# Patient Record
Sex: MALE | Race: WHITE | NOT HISPANIC OR LATINO | Employment: FULL TIME | URBAN - METROPOLITAN AREA
[De-identification: names, ages, dates, MRNs, and addresses within clinical notes are randomized per-mention and may not be internally consistent; named-entity substitution may affect disease eponyms.]

---

## 2017-01-12 ENCOUNTER — COMMUNICATION - HEALTHEAST (OUTPATIENT)
Dept: ADMINISTRATIVE | Facility: HOSPITAL | Age: 57
End: 2017-01-12

## 2017-01-26 ENCOUNTER — COMMUNICATION - HEALTHEAST (OUTPATIENT)
Dept: FAMILY MEDICINE | Facility: CLINIC | Age: 57
End: 2017-01-26

## 2017-02-09 ENCOUNTER — HOSPITAL ENCOUNTER (OUTPATIENT)
Dept: CT IMAGING | Facility: HOSPITAL | Age: 57
Setting detail: RADIATION/ONCOLOGY SERIES
Discharge: STILL ADMITTED OR ADMINISTRATIVE | End: 2017-02-09
Attending: INTERNAL MEDICINE

## 2017-02-09 DIAGNOSIS — C49.A0 GIST, MALIGNANT (H): ICD-10-CM

## 2017-02-10 ENCOUNTER — OFFICE VISIT - HEALTHEAST (OUTPATIENT)
Dept: FAMILY MEDICINE | Facility: CLINIC | Age: 57
End: 2017-02-10

## 2017-02-10 ENCOUNTER — COMMUNICATION - HEALTHEAST (OUTPATIENT)
Dept: FAMILY MEDICINE | Facility: CLINIC | Age: 57
End: 2017-02-10

## 2017-02-10 DIAGNOSIS — E66.9 OBESITY, UNSPECIFIED: ICD-10-CM

## 2017-02-10 DIAGNOSIS — L82.1 SEBORRHEIC KERATOSES: ICD-10-CM

## 2017-02-10 DIAGNOSIS — Z00.00 HEALTH CARE MAINTENANCE: ICD-10-CM

## 2017-02-10 DIAGNOSIS — E78.5 HYPERLIPIDEMIA: ICD-10-CM

## 2017-02-10 DIAGNOSIS — I10 HTN (HYPERTENSION): ICD-10-CM

## 2017-02-10 DIAGNOSIS — Z12.5 SCREENING PSA (PROSTATE SPECIFIC ANTIGEN): ICD-10-CM

## 2017-02-10 LAB
CHOLEST SERPL-MCNC: 149 MG/DL
FASTING STATUS PATIENT QL REPORTED: YES
HDLC SERPL-MCNC: 48 MG/DL
LDLC SERPL CALC-MCNC: 81 MG/DL
TRIGL SERPL-MCNC: 98 MG/DL

## 2017-02-13 ENCOUNTER — COMMUNICATION - HEALTHEAST (OUTPATIENT)
Dept: FAMILY MEDICINE | Facility: CLINIC | Age: 57
End: 2017-02-13

## 2017-02-13 ENCOUNTER — AMBULATORY - HEALTHEAST (OUTPATIENT)
Dept: ONCOLOGY | Facility: HOSPITAL | Age: 57
End: 2017-02-13

## 2017-02-13 ENCOUNTER — AMBULATORY - HEALTHEAST (OUTPATIENT)
Dept: INFUSION THERAPY | Facility: HOSPITAL | Age: 57
End: 2017-02-13

## 2017-02-13 ENCOUNTER — OFFICE VISIT - HEALTHEAST (OUTPATIENT)
Dept: ONCOLOGY | Facility: HOSPITAL | Age: 57
End: 2017-02-13

## 2017-02-13 DIAGNOSIS — C49.A3 MALIGNANT GASTROINTESTINAL STROMAL TUMOR (GIST) OF SMALL INTESTINE (H): ICD-10-CM

## 2017-02-13 DIAGNOSIS — C49.A4 GIST (GASTROINTESTINAL STROMA TUMOR), MALIGNANT, COLON (H): ICD-10-CM

## 2017-12-11 ENCOUNTER — OFFICE VISIT - HEALTHEAST (OUTPATIENT)
Dept: FAMILY MEDICINE | Facility: CLINIC | Age: 57
End: 2017-12-11

## 2017-12-11 DIAGNOSIS — Z00.00 ANNUAL PHYSICAL EXAM: ICD-10-CM

## 2017-12-11 DIAGNOSIS — E78.5 HYPERLIPIDEMIA: ICD-10-CM

## 2017-12-11 DIAGNOSIS — E66.9 OBESITY: ICD-10-CM

## 2017-12-11 DIAGNOSIS — I10 HTN (HYPERTENSION): ICD-10-CM

## 2017-12-11 LAB
CHOLEST SERPL-MCNC: 158 MG/DL
FASTING STATUS PATIENT QL REPORTED: YES
HDLC SERPL-MCNC: 49 MG/DL
LDLC SERPL CALC-MCNC: 93 MG/DL
PSA SERPL-MCNC: 0.7 NG/ML (ref 0–3.5)
TRIGL SERPL-MCNC: 81 MG/DL

## 2017-12-11 ASSESSMENT — MIFFLIN-ST. JEOR: SCORE: 2000.92

## 2017-12-12 ENCOUNTER — COMMUNICATION - HEALTHEAST (OUTPATIENT)
Dept: FAMILY MEDICINE | Facility: CLINIC | Age: 57
End: 2017-12-12

## 2018-07-13 ENCOUNTER — OFFICE VISIT - HEALTHEAST (OUTPATIENT)
Dept: FAMILY MEDICINE | Facility: CLINIC | Age: 58
End: 2018-07-13

## 2018-07-13 DIAGNOSIS — E78.5 HYPERLIPIDEMIA: ICD-10-CM

## 2018-07-13 DIAGNOSIS — I25.10 CAD (CORONARY ARTERY DISEASE): ICD-10-CM

## 2018-07-13 LAB
ALT SERPL W P-5'-P-CCNC: 35 U/L (ref 0–45)
CHOLEST SERPL-MCNC: 142 MG/DL
FASTING STATUS PATIENT QL REPORTED: YES
HDLC SERPL-MCNC: 51 MG/DL
LDLC SERPL CALC-MCNC: 68 MG/DL
TRIGL SERPL-MCNC: 117 MG/DL

## 2018-07-14 ENCOUNTER — COMMUNICATION - HEALTHEAST (OUTPATIENT)
Dept: FAMILY MEDICINE | Facility: CLINIC | Age: 58
End: 2018-07-14

## 2018-09-05 ENCOUNTER — OFFICE VISIT - HEALTHEAST (OUTPATIENT)
Dept: FAMILY MEDICINE | Facility: CLINIC | Age: 58
End: 2018-09-05

## 2018-09-05 DIAGNOSIS — M79.671 RIGHT FOOT PAIN: ICD-10-CM

## 2018-10-05 ENCOUNTER — OFFICE VISIT - HEALTHEAST (OUTPATIENT)
Dept: FAMILY MEDICINE | Facility: CLINIC | Age: 58
End: 2018-10-05

## 2018-10-05 DIAGNOSIS — E78.2 MIXED HYPERLIPIDEMIA: ICD-10-CM

## 2018-10-05 DIAGNOSIS — R10.9 LEFT SIDED ABDOMINAL PAIN: ICD-10-CM

## 2018-10-05 DIAGNOSIS — Z23 FLU VACCINE NEED: ICD-10-CM

## 2018-10-05 DIAGNOSIS — I10 ESSENTIAL HYPERTENSION: ICD-10-CM

## 2018-10-05 ASSESSMENT — MIFFLIN-ST. JEOR: SCORE: 2013.28

## 2018-12-14 ENCOUNTER — COMMUNICATION - HEALTHEAST (OUTPATIENT)
Dept: FAMILY MEDICINE | Facility: CLINIC | Age: 58
End: 2018-12-14

## 2018-12-14 ENCOUNTER — AMBULATORY - HEALTHEAST (OUTPATIENT)
Dept: FAMILY MEDICINE | Facility: CLINIC | Age: 58
End: 2018-12-14

## 2018-12-14 DIAGNOSIS — Z12.5 SCREENING PSA (PROSTATE SPECIFIC ANTIGEN): ICD-10-CM

## 2018-12-14 DIAGNOSIS — E78.5 HYPERLIPIDEMIA, UNSPECIFIED HYPERLIPIDEMIA TYPE: ICD-10-CM

## 2018-12-14 DIAGNOSIS — Z00.00 HEALTH CARE MAINTENANCE: ICD-10-CM

## 2018-12-17 ENCOUNTER — COMMUNICATION - HEALTHEAST (OUTPATIENT)
Dept: FAMILY MEDICINE | Facility: CLINIC | Age: 58
End: 2018-12-17

## 2018-12-17 ENCOUNTER — AMBULATORY - HEALTHEAST (OUTPATIENT)
Dept: LAB | Facility: CLINIC | Age: 58
End: 2018-12-17

## 2018-12-17 ENCOUNTER — OFFICE VISIT - HEALTHEAST (OUTPATIENT)
Dept: FAMILY MEDICINE | Facility: CLINIC | Age: 58
End: 2018-12-17

## 2018-12-17 DIAGNOSIS — I10 ESSENTIAL HYPERTENSION: ICD-10-CM

## 2018-12-17 DIAGNOSIS — E78.5 HYPERLIPIDEMIA, UNSPECIFIED HYPERLIPIDEMIA TYPE: ICD-10-CM

## 2018-12-17 DIAGNOSIS — Z00.00 HEALTH CARE MAINTENANCE: ICD-10-CM

## 2018-12-17 DIAGNOSIS — Z12.5 SCREENING PSA (PROSTATE SPECIFIC ANTIGEN): ICD-10-CM

## 2018-12-17 DIAGNOSIS — Z00.00 ROUTINE GENERAL MEDICAL EXAMINATION AT A HEALTH CARE FACILITY: ICD-10-CM

## 2018-12-17 DIAGNOSIS — M79.10 MYALGIA: ICD-10-CM

## 2018-12-17 DIAGNOSIS — C49.A3 MALIGNANT GASTROINTESTINAL STROMAL TUMOR (GIST) OF SMALL INTESTINE (H): ICD-10-CM

## 2018-12-17 DIAGNOSIS — Z11.59 ENCOUNTER FOR HEPATITIS C SCREENING TEST FOR LOW RISK PATIENT: ICD-10-CM

## 2018-12-17 LAB
ALBUMIN SERPL-MCNC: 3.9 G/DL (ref 3.5–5)
ALP SERPL-CCNC: 68 U/L (ref 45–120)
ALT SERPL W P-5'-P-CCNC: 26 U/L (ref 0–45)
ANION GAP SERPL CALCULATED.3IONS-SCNC: 7 MMOL/L (ref 5–18)
AST SERPL W P-5'-P-CCNC: 21 U/L (ref 0–40)
BILIRUB SERPL-MCNC: 0.7 MG/DL (ref 0–1)
BUN SERPL-MCNC: 17 MG/DL (ref 8–22)
CALCIUM SERPL-MCNC: 9.6 MG/DL (ref 8.5–10.5)
CHLORIDE BLD-SCNC: 106 MMOL/L (ref 98–107)
CHOLEST SERPL-MCNC: 138 MG/DL
CO2 SERPL-SCNC: 29 MMOL/L (ref 22–31)
CREAT SERPL-MCNC: 0.86 MG/DL (ref 0.7–1.3)
FASTING STATUS PATIENT QL REPORTED: YES
GFR SERPL CREATININE-BSD FRML MDRD: >60 ML/MIN/1.73M2
GLUCOSE BLD-MCNC: 99 MG/DL (ref 70–125)
HDLC SERPL-MCNC: 42 MG/DL
LDLC SERPL CALC-MCNC: 81 MG/DL
POTASSIUM BLD-SCNC: 4.3 MMOL/L (ref 3.5–5)
PROT SERPL-MCNC: 6.5 G/DL (ref 6–8)
PSA SERPL-MCNC: 0.5 NG/ML (ref 0–3.5)
SODIUM SERPL-SCNC: 142 MMOL/L (ref 136–145)
TRIGL SERPL-MCNC: 76 MG/DL

## 2018-12-17 ASSESSMENT — MIFFLIN-ST. JEOR: SCORE: 2007.84

## 2018-12-18 ENCOUNTER — COMMUNICATION - HEALTHEAST (OUTPATIENT)
Dept: FAMILY MEDICINE | Facility: CLINIC | Age: 58
End: 2018-12-18

## 2018-12-18 ENCOUNTER — AMBULATORY - HEALTHEAST (OUTPATIENT)
Dept: FAMILY MEDICINE | Facility: CLINIC | Age: 58
End: 2018-12-18

## 2018-12-18 DIAGNOSIS — C49.A3 MALIGNANT GASTROINTESTINAL STROMAL TUMOR (GIST) OF SMALL INTESTINE (H): ICD-10-CM

## 2018-12-18 DIAGNOSIS — Z11.59 ENCOUNTER FOR HEPATITIS C SCREENING TEST FOR LOW RISK PATIENT: ICD-10-CM

## 2018-12-18 DIAGNOSIS — E78.5 HYPERLIPIDEMIA, UNSPECIFIED HYPERLIPIDEMIA TYPE: ICD-10-CM

## 2018-12-18 DIAGNOSIS — M79.10 MYALGIA: ICD-10-CM

## 2018-12-18 LAB
25(OH)D3 SERPL-MCNC: 72 NG/ML (ref 30–80)
25(OH)D3 SERPL-MCNC: 72 NG/ML (ref 30–80)
CK SERPL-CCNC: 111 U/L (ref 30–190)

## 2018-12-19 LAB — HCV AB SERPL QL IA: NEGATIVE

## 2019-01-25 ENCOUNTER — COMMUNICATION - HEALTHEAST (OUTPATIENT)
Dept: FAMILY MEDICINE | Facility: CLINIC | Age: 59
End: 2019-01-25

## 2019-01-25 DIAGNOSIS — E78.5 HYPERLIPIDEMIA: ICD-10-CM

## 2019-02-18 ENCOUNTER — COMMUNICATION - HEALTHEAST (OUTPATIENT)
Dept: FAMILY MEDICINE | Facility: CLINIC | Age: 59
End: 2019-02-18

## 2019-02-18 DIAGNOSIS — I10 HTN (HYPERTENSION): ICD-10-CM

## 2019-03-25 ENCOUNTER — COMMUNICATION - HEALTHEAST (OUTPATIENT)
Dept: FAMILY MEDICINE | Facility: CLINIC | Age: 59
End: 2019-03-25

## 2019-04-01 ENCOUNTER — OFFICE VISIT - HEALTHEAST (OUTPATIENT)
Dept: FAMILY MEDICINE | Facility: CLINIC | Age: 59
End: 2019-04-01

## 2019-04-01 DIAGNOSIS — M25.561 ACUTE PAIN OF RIGHT KNEE: ICD-10-CM

## 2019-04-01 DIAGNOSIS — I10 ESSENTIAL HYPERTENSION: ICD-10-CM

## 2019-04-02 ENCOUNTER — RECORDS - HEALTHEAST (OUTPATIENT)
Dept: ADMINISTRATIVE | Facility: OTHER | Age: 59
End: 2019-04-02

## 2019-04-08 ENCOUNTER — COMMUNICATION - HEALTHEAST (OUTPATIENT)
Dept: FAMILY MEDICINE | Facility: CLINIC | Age: 59
End: 2019-04-08

## 2019-04-15 ENCOUNTER — COMMUNICATION - HEALTHEAST (OUTPATIENT)
Dept: FAMILY MEDICINE | Facility: CLINIC | Age: 59
End: 2019-04-15

## 2019-04-26 ENCOUNTER — COMMUNICATION - HEALTHEAST (OUTPATIENT)
Dept: FAMILY MEDICINE | Facility: CLINIC | Age: 59
End: 2019-04-26

## 2019-04-26 ENCOUNTER — AMBULATORY - HEALTHEAST (OUTPATIENT)
Dept: FAMILY MEDICINE | Facility: CLINIC | Age: 59
End: 2019-04-26

## 2019-04-26 DIAGNOSIS — G89.29 CHRONIC PAIN OF RIGHT KNEE: ICD-10-CM

## 2019-04-26 DIAGNOSIS — M25.561 CHRONIC PAIN OF RIGHT KNEE: ICD-10-CM

## 2019-05-01 ENCOUNTER — COMMUNICATION - HEALTHEAST (OUTPATIENT)
Dept: FAMILY MEDICINE | Facility: CLINIC | Age: 59
End: 2019-05-01

## 2019-05-02 ENCOUNTER — RECORDS - HEALTHEAST (OUTPATIENT)
Dept: ADMINISTRATIVE | Facility: OTHER | Age: 59
End: 2019-05-02

## 2019-05-06 ENCOUNTER — COMMUNICATION - HEALTHEAST (OUTPATIENT)
Dept: FAMILY MEDICINE | Facility: CLINIC | Age: 59
End: 2019-05-06

## 2019-05-09 ENCOUNTER — COMMUNICATION - HEALTHEAST (OUTPATIENT)
Dept: FAMILY MEDICINE | Facility: CLINIC | Age: 59
End: 2019-05-09

## 2019-05-10 ENCOUNTER — COMMUNICATION - HEALTHEAST (OUTPATIENT)
Dept: FAMILY MEDICINE | Facility: CLINIC | Age: 59
End: 2019-05-10

## 2019-05-10 DIAGNOSIS — S83.241A OTHER TEAR OF MEDIAL MENISCUS OF RIGHT KNEE AS CURRENT INJURY, INITIAL ENCOUNTER: ICD-10-CM

## 2019-05-17 ENCOUNTER — COMMUNICATION - HEALTHEAST (OUTPATIENT)
Dept: FAMILY MEDICINE | Facility: CLINIC | Age: 59
End: 2019-05-17

## 2019-05-17 ENCOUNTER — RECORDS - HEALTHEAST (OUTPATIENT)
Dept: ADMINISTRATIVE | Facility: OTHER | Age: 59
End: 2019-05-17

## 2019-05-30 ENCOUNTER — OFFICE VISIT - HEALTHEAST (OUTPATIENT)
Dept: FAMILY MEDICINE | Facility: CLINIC | Age: 59
End: 2019-05-30

## 2019-05-30 ENCOUNTER — COMMUNICATION - HEALTHEAST (OUTPATIENT)
Dept: FAMILY MEDICINE | Facility: CLINIC | Age: 59
End: 2019-05-30

## 2019-05-30 DIAGNOSIS — S83.241D TEAR OF MEDIAL MENISCUS OF RIGHT KNEE, CURRENT, UNSPECIFIED TEAR TYPE, SUBSEQUENT ENCOUNTER: ICD-10-CM

## 2019-05-30 DIAGNOSIS — Z01.818 PRE-OPERATIVE EXAMINATION: ICD-10-CM

## 2019-05-30 DIAGNOSIS — I10 ESSENTIAL HYPERTENSION: ICD-10-CM

## 2019-05-30 DIAGNOSIS — I25.10 CORONARY ARTERY DISEASE INVOLVING NATIVE HEART WITHOUT ANGINA PECTORIS, UNSPECIFIED VESSEL OR LESION TYPE: ICD-10-CM

## 2019-05-30 LAB
ANION GAP SERPL CALCULATED.3IONS-SCNC: 8 MMOL/L (ref 5–18)
ATRIAL RATE - MUSE: 55 BPM
BASOPHILS # BLD AUTO: 0 THOU/UL (ref 0–0.2)
BASOPHILS NFR BLD AUTO: 1 % (ref 0–2)
BUN SERPL-MCNC: 18 MG/DL (ref 8–22)
CALCIUM SERPL-MCNC: 10.1 MG/DL (ref 8.5–10.5)
CHLORIDE BLD-SCNC: 103 MMOL/L (ref 98–107)
CO2 SERPL-SCNC: 28 MMOL/L (ref 22–31)
CREAT SERPL-MCNC: 0.94 MG/DL (ref 0.7–1.3)
DIASTOLIC BLOOD PRESSURE - MUSE: NORMAL MMHG
EOSINOPHIL # BLD AUTO: 0.2 THOU/UL (ref 0–0.4)
EOSINOPHIL NFR BLD AUTO: 4 % (ref 0–6)
ERYTHROCYTE [DISTWIDTH] IN BLOOD BY AUTOMATED COUNT: 11.8 % (ref 11–14.5)
GFR SERPL CREATININE-BSD FRML MDRD: >60 ML/MIN/1.73M2
GLUCOSE BLD-MCNC: 86 MG/DL (ref 70–125)
HCT VFR BLD AUTO: 44.8 % (ref 40–54)
HGB BLD-MCNC: 15.5 G/DL (ref 14–18)
INTERPRETATION ECG - MUSE: NORMAL
LYMPHOCYTES # BLD AUTO: 1.7 THOU/UL (ref 0.8–4.4)
LYMPHOCYTES NFR BLD AUTO: 34 % (ref 20–40)
MCH RBC QN AUTO: 32.1 PG (ref 27–34)
MCHC RBC AUTO-ENTMCNC: 34.6 G/DL (ref 32–36)
MCV RBC AUTO: 93 FL (ref 80–100)
MONOCYTES # BLD AUTO: 0.6 THOU/UL (ref 0–0.9)
MONOCYTES NFR BLD AUTO: 11 % (ref 2–10)
NEUTROPHILS # BLD AUTO: 2.5 THOU/UL (ref 2–7.7)
NEUTROPHILS NFR BLD AUTO: 51 % (ref 50–70)
P AXIS - MUSE: 31 DEGREES
PLATELET # BLD AUTO: 179 THOU/UL (ref 140–440)
PMV BLD AUTO: 8.2 FL (ref 7–10)
POTASSIUM BLD-SCNC: 4.6 MMOL/L (ref 3.5–5)
PR INTERVAL - MUSE: 204 MS
QRS DURATION - MUSE: 94 MS
QT - MUSE: 448 MS
QTC - MUSE: 428 MS
R AXIS - MUSE: 12 DEGREES
RBC # BLD AUTO: 4.83 MILL/UL (ref 4.4–6.2)
SODIUM SERPL-SCNC: 139 MMOL/L (ref 136–145)
SYSTOLIC BLOOD PRESSURE - MUSE: NORMAL MMHG
T AXIS - MUSE: 20 DEGREES
VENTRICULAR RATE- MUSE: 55 BPM
WBC: 5 THOU/UL (ref 4–11)

## 2019-05-30 ASSESSMENT — MIFFLIN-ST. JEOR: SCORE: 2045.94

## 2019-06-03 ENCOUNTER — COMMUNICATION - HEALTHEAST (OUTPATIENT)
Dept: FAMILY MEDICINE | Facility: CLINIC | Age: 59
End: 2019-06-03

## 2019-06-03 ENCOUNTER — HOSPITAL ENCOUNTER (OUTPATIENT)
Dept: CARDIOLOGY | Facility: HOSPITAL | Age: 59
Discharge: HOME | End: 2019-06-03
Attending: FAMILY MEDICINE

## 2019-06-03 ENCOUNTER — HOSPITAL ENCOUNTER (OUTPATIENT)
Dept: NUCLEAR MEDICINE | Facility: HOSPITAL | Age: 59
Discharge: HOME | End: 2019-06-03
Attending: FAMILY MEDICINE

## 2019-06-03 DIAGNOSIS — I25.10 CORONARY ARTERY DISEASE INVOLVING NATIVE HEART WITHOUT ANGINA PECTORIS, UNSPECIFIED VESSEL OR LESION TYPE: ICD-10-CM

## 2019-06-03 DIAGNOSIS — S83.241D TEAR OF MEDIAL MENISCUS OF RIGHT KNEE, CURRENT, UNSPECIFIED TEAR TYPE, SUBSEQUENT ENCOUNTER: ICD-10-CM

## 2019-06-03 DIAGNOSIS — Z01.818 PRE-OPERATIVE EXAMINATION: ICD-10-CM

## 2019-06-03 LAB
CV STRESS CURRENT BP HE: NORMAL
CV STRESS CURRENT HR HE: 101
CV STRESS CURRENT HR HE: 103
CV STRESS CURRENT HR HE: 104
CV STRESS CURRENT HR HE: 106
CV STRESS CURRENT HR HE: 111
CV STRESS CURRENT HR HE: 119
CV STRESS CURRENT HR HE: 120
CV STRESS CURRENT HR HE: 120
CV STRESS CURRENT HR HE: 121
CV STRESS CURRENT HR HE: 129
CV STRESS CURRENT HR HE: 134
CV STRESS CURRENT HR HE: 135
CV STRESS CURRENT HR HE: 137
CV STRESS CURRENT HR HE: 141
CV STRESS CURRENT HR HE: 143
CV STRESS CURRENT HR HE: 146
CV STRESS CURRENT HR HE: 146
CV STRESS CURRENT HR HE: 65
CV STRESS CURRENT HR HE: 73
CV STRESS CURRENT HR HE: 89
CV STRESS CURRENT HR HE: 90
CV STRESS CURRENT HR HE: 90
CV STRESS CURRENT HR HE: 91
CV STRESS CURRENT HR HE: 91
CV STRESS CURRENT HR HE: 95
CV STRESS CURRENT HR HE: 96
CV STRESS CURRENT HR HE: 99
CV STRESS DEVIATION TIME HE: NORMAL
CV STRESS ECHO PERCENT HR HE: NORMAL
CV STRESS EXERCISE STAGE HE: NORMAL
CV STRESS EXERCISE STAGE REACHED HE: NORMAL
CV STRESS FINAL RESTING BP HE: NORMAL
CV STRESS FINAL RESTING HR HE: 90
CV STRESS MAX HR HE: 146
CV STRESS MAX TREADMILL GRADE HE: 14
CV STRESS MAX TREADMILL SPEED HE: 3.4
CV STRESS PEAK DIA BP HE: NORMAL
CV STRESS PEAK SYS BP HE: NORMAL
CV STRESS PHASE HE: NORMAL
CV STRESS PROTOCOL HE: NORMAL
CV STRESS RESTING PT POSITION HE: NORMAL
CV STRESS RESTING PT POSITION HE: NORMAL
CV STRESS ST DEVIATION AMOUNT HE: NORMAL
CV STRESS ST DEVIATION ELEVATION HE: NORMAL
CV STRESS ST EVELATION AMOUNT HE: NORMAL
CV STRESS TEST TYPE HE: NORMAL
CV STRESS TOTAL STAGE TIME MIN 1 HE: NORMAL
NUC STRESS EJECTION FRACTION: 70 %
STRESS ECHO BASELINE BP: NORMAL
STRESS ECHO BASELINE HR: 65
STRESS ECHO CALCULATED PERCENT HR: 91 %
STRESS ECHO LAST STRESS BP: NORMAL
STRESS ECHO LAST STRESS HR: 146
STRESS ECHO POST ESTIMATED WORKLOAD: 10.3
STRESS ECHO POST EXERCISE DUR MIN: 8
STRESS ECHO POST EXERCISE DUR SEC: 58
STRESS ECHO TARGET HR: 137

## 2019-06-21 ENCOUNTER — RECORDS - HEALTHEAST (OUTPATIENT)
Dept: ADMINISTRATIVE | Facility: OTHER | Age: 59
End: 2019-06-21

## 2019-07-02 ENCOUNTER — RECORDS - HEALTHEAST (OUTPATIENT)
Dept: ADMINISTRATIVE | Facility: OTHER | Age: 59
End: 2019-07-02

## 2019-10-27 ENCOUNTER — COMMUNICATION - HEALTHEAST (OUTPATIENT)
Dept: FAMILY MEDICINE | Facility: CLINIC | Age: 59
End: 2019-10-27

## 2019-10-27 DIAGNOSIS — I10 HTN (HYPERTENSION): ICD-10-CM

## 2019-11-20 ENCOUNTER — RECORDS - HEALTHEAST (OUTPATIENT)
Dept: GENERAL RADIOLOGY | Facility: CLINIC | Age: 59
End: 2019-11-20

## 2019-11-20 ENCOUNTER — OFFICE VISIT - HEALTHEAST (OUTPATIENT)
Dept: FAMILY MEDICINE | Facility: CLINIC | Age: 59
End: 2019-11-20

## 2019-11-20 DIAGNOSIS — M25.552 HIP PAIN, LEFT: ICD-10-CM

## 2019-11-20 DIAGNOSIS — I10 ESSENTIAL HYPERTENSION: ICD-10-CM

## 2019-11-20 DIAGNOSIS — M16.12 PRIMARY OSTEOARTHRITIS OF LEFT HIP: ICD-10-CM

## 2019-11-20 DIAGNOSIS — M25.552 PAIN IN LEFT HIP: ICD-10-CM

## 2019-11-20 ASSESSMENT — MIFFLIN-ST. JEOR: SCORE: 2023.26

## 2019-12-18 ENCOUNTER — OFFICE VISIT - HEALTHEAST (OUTPATIENT)
Dept: FAMILY MEDICINE | Facility: CLINIC | Age: 59
End: 2019-12-18

## 2019-12-18 ENCOUNTER — COMMUNICATION - HEALTHEAST (OUTPATIENT)
Dept: FAMILY MEDICINE | Facility: CLINIC | Age: 59
End: 2019-12-18

## 2019-12-18 DIAGNOSIS — I25.10 CORONARY ARTERY DISEASE INVOLVING NATIVE HEART WITHOUT ANGINA PECTORIS, UNSPECIFIED VESSEL OR LESION TYPE: ICD-10-CM

## 2019-12-18 DIAGNOSIS — D12.6 ADENOMATOUS POLYP OF COLON, UNSPECIFIED PART OF COLON: ICD-10-CM

## 2019-12-18 DIAGNOSIS — E78.5 HYPERLIPIDEMIA, UNSPECIFIED HYPERLIPIDEMIA TYPE: ICD-10-CM

## 2019-12-18 DIAGNOSIS — Z00.00 HEALTH CARE MAINTENANCE: ICD-10-CM

## 2019-12-18 DIAGNOSIS — Z23 NEED FOR SHINGLES VACCINE: ICD-10-CM

## 2019-12-18 DIAGNOSIS — R97.20 RISING PSA LEVEL: ICD-10-CM

## 2019-12-18 DIAGNOSIS — I10 ESSENTIAL HYPERTENSION: ICD-10-CM

## 2019-12-18 DIAGNOSIS — E66.09 OBESITY DUE TO EXCESS CALORIES WITHOUT SERIOUS COMORBIDITY, UNSPECIFIED CLASSIFICATION: ICD-10-CM

## 2019-12-18 DIAGNOSIS — Z12.5 SCREENING PSA (PROSTATE SPECIFIC ANTIGEN): ICD-10-CM

## 2019-12-18 LAB
ALBUMIN SERPL-MCNC: 4.3 G/DL (ref 3.5–5)
ALP SERPL-CCNC: 67 U/L (ref 45–120)
ALT SERPL W P-5'-P-CCNC: 28 U/L (ref 0–45)
ANION GAP SERPL CALCULATED.3IONS-SCNC: 11 MMOL/L (ref 5–18)
AST SERPL W P-5'-P-CCNC: 22 U/L (ref 0–40)
BASOPHILS # BLD AUTO: 0 THOU/UL (ref 0–0.2)
BASOPHILS NFR BLD AUTO: 1 % (ref 0–2)
BILIRUB SERPL-MCNC: 1.2 MG/DL (ref 0–1)
BUN SERPL-MCNC: 15 MG/DL (ref 8–22)
CALCIUM SERPL-MCNC: 10 MG/DL (ref 8.5–10.5)
CHLORIDE BLD-SCNC: 100 MMOL/L (ref 98–107)
CHOLEST SERPL-MCNC: 153 MG/DL
CK SERPL-CCNC: 73 U/L (ref 30–190)
CO2 SERPL-SCNC: 28 MMOL/L (ref 22–31)
CREAT SERPL-MCNC: 0.91 MG/DL (ref 0.7–1.3)
EOSINOPHIL # BLD AUTO: 0.2 THOU/UL (ref 0–0.4)
EOSINOPHIL NFR BLD AUTO: 5 % (ref 0–6)
ERYTHROCYTE [DISTWIDTH] IN BLOOD BY AUTOMATED COUNT: 12.1 % (ref 11–14.5)
FASTING STATUS PATIENT QL REPORTED: YES
GFR SERPL CREATININE-BSD FRML MDRD: >60 ML/MIN/1.73M2
GLUCOSE BLD-MCNC: 93 MG/DL (ref 70–125)
HCT VFR BLD AUTO: 47.2 % (ref 40–54)
HDLC SERPL-MCNC: 50 MG/DL
HGB BLD-MCNC: 16.2 G/DL (ref 14–18)
LDLC SERPL CALC-MCNC: 84 MG/DL
LYMPHOCYTES # BLD AUTO: 1.4 THOU/UL (ref 0.8–4.4)
LYMPHOCYTES NFR BLD AUTO: 27 % (ref 20–40)
MCH RBC QN AUTO: 32.1 PG (ref 27–34)
MCHC RBC AUTO-ENTMCNC: 34.3 G/DL (ref 32–36)
MCV RBC AUTO: 94 FL (ref 80–100)
MONOCYTES # BLD AUTO: 0.5 THOU/UL (ref 0–0.9)
MONOCYTES NFR BLD AUTO: 10 % (ref 2–10)
NEUTROPHILS # BLD AUTO: 3.1 THOU/UL (ref 2–7.7)
NEUTROPHILS NFR BLD AUTO: 58 % (ref 50–70)
PLATELET # BLD AUTO: 171 THOU/UL (ref 140–440)
PMV BLD AUTO: 8.5 FL (ref 7–10)
POTASSIUM BLD-SCNC: 4.4 MMOL/L (ref 3.5–5)
PROT SERPL-MCNC: 7 G/DL (ref 6–8)
PSA SERPL-MCNC: 1.5 NG/ML (ref 0–3.5)
RBC # BLD AUTO: 5.04 MILL/UL (ref 4.4–6.2)
SODIUM SERPL-SCNC: 139 MMOL/L (ref 136–145)
TRIGL SERPL-MCNC: 97 MG/DL
WBC: 5.3 THOU/UL (ref 4–11)

## 2019-12-18 ASSESSMENT — MIFFLIN-ST. JEOR: SCORE: 2009.31

## 2020-01-09 ENCOUNTER — RECORDS - HEALTHEAST (OUTPATIENT)
Dept: ADMINISTRATIVE | Facility: OTHER | Age: 60
End: 2020-01-09

## 2020-01-17 ENCOUNTER — COMMUNICATION - HEALTHEAST (OUTPATIENT)
Dept: FAMILY MEDICINE | Facility: CLINIC | Age: 60
End: 2020-01-17

## 2020-01-17 DIAGNOSIS — E78.5 HYPERLIPIDEMIA: ICD-10-CM

## 2020-02-11 ENCOUNTER — COMMUNICATION - HEALTHEAST (OUTPATIENT)
Dept: FAMILY MEDICINE | Facility: CLINIC | Age: 60
End: 2020-02-11

## 2020-02-19 ENCOUNTER — AMBULATORY - HEALTHEAST (OUTPATIENT)
Dept: NURSING | Facility: CLINIC | Age: 60
End: 2020-02-19

## 2020-04-21 ENCOUNTER — COMMUNICATION - HEALTHEAST (OUTPATIENT)
Dept: FAMILY MEDICINE | Facility: CLINIC | Age: 60
End: 2020-04-21

## 2020-04-21 DIAGNOSIS — I10 HTN (HYPERTENSION): ICD-10-CM

## 2020-06-25 ENCOUNTER — OFFICE VISIT - HEALTHEAST (OUTPATIENT)
Dept: FAMILY MEDICINE | Facility: CLINIC | Age: 60
End: 2020-06-25

## 2020-06-25 DIAGNOSIS — I10 HTN (HYPERTENSION): ICD-10-CM

## 2020-06-25 DIAGNOSIS — I10 ESSENTIAL HYPERTENSION: ICD-10-CM

## 2020-06-25 RX ORDER — UBIDECARENONE 100 MG
300 CAPSULE ORAL DAILY
Status: SHIPPED | COMMUNITY
Start: 2020-06-25

## 2020-06-29 ENCOUNTER — COMMUNICATION - HEALTHEAST (OUTPATIENT)
Dept: FAMILY MEDICINE | Facility: CLINIC | Age: 60
End: 2020-06-29

## 2020-07-16 ENCOUNTER — COMMUNICATION - HEALTHEAST (OUTPATIENT)
Dept: FAMILY MEDICINE | Facility: CLINIC | Age: 60
End: 2020-07-16

## 2020-07-16 ENCOUNTER — AMBULATORY - HEALTHEAST (OUTPATIENT)
Dept: LAB | Facility: CLINIC | Age: 60
End: 2020-07-16

## 2020-07-16 DIAGNOSIS — I10 HTN (HYPERTENSION): ICD-10-CM

## 2020-07-16 LAB
ANION GAP SERPL CALCULATED.3IONS-SCNC: 9 MMOL/L (ref 5–18)
BUN SERPL-MCNC: 18 MG/DL (ref 8–22)
CALCIUM SERPL-MCNC: 9.8 MG/DL (ref 8.5–10.5)
CHLORIDE BLD-SCNC: 102 MMOL/L (ref 98–107)
CO2 SERPL-SCNC: 28 MMOL/L (ref 22–31)
CREAT SERPL-MCNC: 0.87 MG/DL (ref 0.7–1.3)
GFR SERPL CREATININE-BSD FRML MDRD: >60 ML/MIN/1.73M2
GLUCOSE BLD-MCNC: 89 MG/DL (ref 70–125)
POTASSIUM BLD-SCNC: 3.9 MMOL/L (ref 3.5–5)
SODIUM SERPL-SCNC: 139 MMOL/L (ref 136–145)

## 2020-07-17 ENCOUNTER — COMMUNICATION - HEALTHEAST (OUTPATIENT)
Dept: FAMILY MEDICINE | Facility: CLINIC | Age: 60
End: 2020-07-17

## 2020-08-31 ENCOUNTER — COMMUNICATION - HEALTHEAST (OUTPATIENT)
Dept: FAMILY MEDICINE | Facility: CLINIC | Age: 60
End: 2020-08-31

## 2020-08-31 DIAGNOSIS — I10 HTN (HYPERTENSION): ICD-10-CM

## 2020-09-02 ENCOUNTER — AMBULATORY - HEALTHEAST (OUTPATIENT)
Dept: FAMILY MEDICINE | Facility: CLINIC | Age: 60
End: 2020-09-02

## 2020-09-02 ENCOUNTER — COMMUNICATION - HEALTHEAST (OUTPATIENT)
Dept: FAMILY MEDICINE | Facility: CLINIC | Age: 60
End: 2020-09-02

## 2020-09-02 DIAGNOSIS — M25.562 CHRONIC PAIN OF LEFT KNEE: ICD-10-CM

## 2020-09-02 DIAGNOSIS — G89.29 CHRONIC PAIN OF LEFT KNEE: ICD-10-CM

## 2020-09-14 ENCOUNTER — COMMUNICATION - HEALTHEAST (OUTPATIENT)
Dept: FAMILY MEDICINE | Facility: CLINIC | Age: 60
End: 2020-09-14

## 2020-10-09 ENCOUNTER — OFFICE VISIT - HEALTHEAST (OUTPATIENT)
Dept: FAMILY MEDICINE | Facility: CLINIC | Age: 60
End: 2020-10-09

## 2020-10-09 DIAGNOSIS — I10 ESSENTIAL HYPERTENSION: ICD-10-CM

## 2020-10-09 DIAGNOSIS — E78.5 HYPERLIPIDEMIA, UNSPECIFIED HYPERLIPIDEMIA TYPE: ICD-10-CM

## 2020-10-09 DIAGNOSIS — Z01.818 PREOP GENERAL PHYSICAL EXAM: ICD-10-CM

## 2020-10-09 DIAGNOSIS — S83.282A TEAR OF LATERAL CARTILAGE OR MENISCUS OF KNEE, CURRENT, LEFT, INITIAL ENCOUNTER: ICD-10-CM

## 2020-10-09 DIAGNOSIS — I25.10 CORONARY ARTERY DISEASE INVOLVING NATIVE HEART WITHOUT ANGINA PECTORIS, UNSPECIFIED VESSEL OR LESION TYPE: ICD-10-CM

## 2020-10-09 LAB
ANION GAP SERPL CALCULATED.3IONS-SCNC: 11 MMOL/L (ref 5–18)
ATRIAL RATE - MUSE: 74 BPM
BUN SERPL-MCNC: 25 MG/DL (ref 8–22)
CALCIUM SERPL-MCNC: 9.9 MG/DL (ref 8.5–10.5)
CHLORIDE BLD-SCNC: 101 MMOL/L (ref 98–107)
CO2 SERPL-SCNC: 27 MMOL/L (ref 22–31)
CREAT SERPL-MCNC: 0.96 MG/DL (ref 0.7–1.3)
DIASTOLIC BLOOD PRESSURE - MUSE: NORMAL
ERYTHROCYTE [DISTWIDTH] IN BLOOD BY AUTOMATED COUNT: 11.5 % (ref 11–14.5)
GFR SERPL CREATININE-BSD FRML MDRD: >60 ML/MIN/1.73M2
GLUCOSE BLD-MCNC: 108 MG/DL (ref 70–125)
HCT VFR BLD AUTO: 47.7 % (ref 40–54)
HGB BLD-MCNC: 16.3 G/DL (ref 14–18)
INTERPRETATION ECG - MUSE: NORMAL
MCH RBC QN AUTO: 32.3 PG (ref 27–34)
MCHC RBC AUTO-ENTMCNC: 34.2 G/DL (ref 32–36)
MCV RBC AUTO: 94 FL (ref 80–100)
P AXIS - MUSE: 41 DEGREES
PLATELET # BLD AUTO: 191 THOU/UL (ref 140–440)
PMV BLD AUTO: 8.2 FL (ref 7–10)
POTASSIUM BLD-SCNC: 3.5 MMOL/L (ref 3.5–5)
PR INTERVAL - MUSE: 184 MS
QRS DURATION - MUSE: 96 MS
QT - MUSE: 382 MS
QTC - MUSE: 424 MS
R AXIS - MUSE: 16 DEGREES
RBC # BLD AUTO: 5.05 MILL/UL (ref 4.4–6.2)
SODIUM SERPL-SCNC: 139 MMOL/L (ref 136–145)
SYSTOLIC BLOOD PRESSURE - MUSE: NORMAL
T AXIS - MUSE: 43 DEGREES
VENTRICULAR RATE- MUSE: 74 BPM
WBC: 5.6 THOU/UL (ref 4–11)

## 2020-10-09 ASSESSMENT — MIFFLIN-ST. JEOR: SCORE: 1959.76

## 2020-10-15 ENCOUNTER — COMMUNICATION - HEALTHEAST (OUTPATIENT)
Dept: FAMILY MEDICINE | Facility: CLINIC | Age: 60
End: 2020-10-15

## 2020-10-15 DIAGNOSIS — Z01.818 PREOP GENERAL PHYSICAL EXAM: ICD-10-CM

## 2020-10-15 DIAGNOSIS — S83.282A TEAR OF LATERAL CARTILAGE OR MENISCUS OF KNEE, CURRENT, LEFT, INITIAL ENCOUNTER: ICD-10-CM

## 2020-10-16 ENCOUNTER — COMMUNICATION - HEALTHEAST (OUTPATIENT)
Dept: FAMILY MEDICINE | Facility: CLINIC | Age: 60
End: 2020-10-16

## 2020-10-19 ENCOUNTER — COMMUNICATION - HEALTHEAST (OUTPATIENT)
Dept: FAMILY MEDICINE | Facility: CLINIC | Age: 60
End: 2020-10-19

## 2020-10-19 DIAGNOSIS — E78.5 HYPERLIPIDEMIA: ICD-10-CM

## 2020-10-21 RX ORDER — ROSUVASTATIN CALCIUM 20 MG/1
TABLET, COATED ORAL
Qty: 90 TABLET | Refills: 3 | Status: SHIPPED | OUTPATIENT
Start: 2020-10-21 | End: 2021-10-11

## 2020-10-23 ENCOUNTER — AMBULATORY - HEALTHEAST (OUTPATIENT)
Dept: LAB | Facility: CLINIC | Age: 60
End: 2020-10-23

## 2020-10-23 DIAGNOSIS — S83.282A TEAR OF LATERAL CARTILAGE OR MENISCUS OF KNEE, CURRENT, LEFT, INITIAL ENCOUNTER: ICD-10-CM

## 2020-10-23 DIAGNOSIS — Z01.818 PREOP GENERAL PHYSICAL EXAM: ICD-10-CM

## 2020-10-25 ENCOUNTER — COMMUNICATION - HEALTHEAST (OUTPATIENT)
Dept: SCHEDULING | Facility: CLINIC | Age: 60
End: 2020-10-25

## 2020-10-26 ENCOUNTER — RECORDS - HEALTHEAST (OUTPATIENT)
Dept: ADMINISTRATIVE | Facility: OTHER | Age: 60
End: 2020-10-26

## 2020-11-10 ENCOUNTER — RECORDS - HEALTHEAST (OUTPATIENT)
Dept: ADMINISTRATIVE | Facility: OTHER | Age: 60
End: 2020-11-10

## 2020-11-10 ENCOUNTER — COMMUNICATION - HEALTHEAST (OUTPATIENT)
Dept: FAMILY MEDICINE | Facility: CLINIC | Age: 60
End: 2020-11-10

## 2020-11-10 ENCOUNTER — AMBULATORY - HEALTHEAST (OUTPATIENT)
Dept: FAMILY MEDICINE | Facility: CLINIC | Age: 60
End: 2020-11-10

## 2020-11-10 DIAGNOSIS — Z20.822 EXPOSURE TO 2019 NOVEL CORONAVIRUS: ICD-10-CM

## 2020-11-12 ENCOUNTER — OFFICE VISIT - HEALTHEAST (OUTPATIENT)
Dept: LAB | Facility: CLINIC | Age: 60
End: 2020-11-12

## 2020-11-12 DIAGNOSIS — Z20.822 EXPOSURE TO 2019 NOVEL CORONAVIRUS: ICD-10-CM

## 2020-11-15 ENCOUNTER — COMMUNICATION - HEALTHEAST (OUTPATIENT)
Dept: SCHEDULING | Facility: CLINIC | Age: 60
End: 2020-11-15

## 2020-11-15 ENCOUNTER — COMMUNICATION - HEALTHEAST (OUTPATIENT)
Dept: FAMILY MEDICINE | Facility: CLINIC | Age: 60
End: 2020-11-15

## 2020-11-17 ENCOUNTER — COMMUNICATION - HEALTHEAST (OUTPATIENT)
Dept: FAMILY MEDICINE | Facility: CLINIC | Age: 60
End: 2020-11-17

## 2020-11-17 ENCOUNTER — OFFICE VISIT - HEALTHEAST (OUTPATIENT)
Dept: FAMILY MEDICINE | Facility: CLINIC | Age: 60
End: 2020-11-17

## 2020-11-17 DIAGNOSIS — I25.10 CORONARY ARTERY DISEASE INVOLVING NATIVE HEART WITHOUT ANGINA PECTORIS, UNSPECIFIED VESSEL OR LESION TYPE: ICD-10-CM

## 2020-11-17 DIAGNOSIS — Z00.00 HEALTH CARE MAINTENANCE: ICD-10-CM

## 2020-11-17 DIAGNOSIS — D12.6 ADENOMATOUS POLYP OF COLON, UNSPECIFIED PART OF COLON: ICD-10-CM

## 2020-11-17 DIAGNOSIS — E66.09 CLASS 2 OBESITY DUE TO EXCESS CALORIES WITHOUT SERIOUS COMORBIDITY IN ADULT, UNSPECIFIED BMI: ICD-10-CM

## 2020-11-17 DIAGNOSIS — E66.812 CLASS 2 OBESITY DUE TO EXCESS CALORIES WITHOUT SERIOUS COMORBIDITY IN ADULT, UNSPECIFIED BMI: ICD-10-CM

## 2020-11-17 DIAGNOSIS — Z23 NEED FOR TETANUS BOOSTER: ICD-10-CM

## 2020-11-17 DIAGNOSIS — Z12.5 SCREENING PSA (PROSTATE SPECIFIC ANTIGEN): ICD-10-CM

## 2020-11-17 DIAGNOSIS — I10 ESSENTIAL HYPERTENSION: ICD-10-CM

## 2020-11-17 LAB
ALBUMIN SERPL-MCNC: 4.2 G/DL (ref 3.5–5)
ALP SERPL-CCNC: 58 U/L (ref 45–120)
ALT SERPL W P-5'-P-CCNC: 28 U/L (ref 0–45)
ANION GAP SERPL CALCULATED.3IONS-SCNC: 9 MMOL/L (ref 5–18)
AST SERPL W P-5'-P-CCNC: 20 U/L (ref 0–40)
BILIRUB SERPL-MCNC: 0.9 MG/DL (ref 0–1)
BUN SERPL-MCNC: 17 MG/DL (ref 8–22)
CALCIUM SERPL-MCNC: 9.9 MG/DL (ref 8.5–10.5)
CHLORIDE BLD-SCNC: 103 MMOL/L (ref 98–107)
CHOLEST SERPL-MCNC: 155 MG/DL
CO2 SERPL-SCNC: 29 MMOL/L (ref 22–31)
CREAT SERPL-MCNC: 0.91 MG/DL (ref 0.7–1.3)
FASTING STATUS PATIENT QL REPORTED: YES
GFR SERPL CREATININE-BSD FRML MDRD: >60 ML/MIN/1.73M2
GLUCOSE BLD-MCNC: 103 MG/DL (ref 70–125)
HDLC SERPL-MCNC: 53 MG/DL
LDLC SERPL CALC-MCNC: 84 MG/DL
POTASSIUM BLD-SCNC: 4.4 MMOL/L (ref 3.5–5)
PROT SERPL-MCNC: 6.5 G/DL (ref 6–8)
PSA SERPL-MCNC: 0.7 NG/ML (ref 0–4.5)
SODIUM SERPL-SCNC: 141 MMOL/L (ref 136–145)
TRIGL SERPL-MCNC: 91 MG/DL

## 2020-11-17 ASSESSMENT — MIFFLIN-ST. JEOR: SCORE: 1960.66

## 2020-11-25 ENCOUNTER — COMMUNICATION - HEALTHEAST (OUTPATIENT)
Dept: FAMILY MEDICINE | Facility: CLINIC | Age: 60
End: 2020-11-25

## 2020-11-25 ENCOUNTER — AMBULATORY - HEALTHEAST (OUTPATIENT)
Dept: FAMILY MEDICINE | Facility: CLINIC | Age: 60
End: 2020-11-25

## 2020-11-25 DIAGNOSIS — I10 HTN (HYPERTENSION): ICD-10-CM

## 2020-12-21 ENCOUNTER — OFFICE VISIT - HEALTHEAST (OUTPATIENT)
Dept: FAMILY MEDICINE | Facility: CLINIC | Age: 60
End: 2020-12-21

## 2020-12-21 DIAGNOSIS — M79.671 RIGHT FOOT PAIN: ICD-10-CM

## 2020-12-21 ASSESSMENT — MIFFLIN-ST. JEOR: SCORE: 1972.46

## 2020-12-22 ENCOUNTER — RECORDS - HEALTHEAST (OUTPATIENT)
Dept: ADMINISTRATIVE | Facility: OTHER | Age: 60
End: 2020-12-22

## 2021-01-05 ENCOUNTER — RECORDS - HEALTHEAST (OUTPATIENT)
Dept: ADMINISTRATIVE | Facility: OTHER | Age: 61
End: 2021-01-05

## 2021-04-07 ENCOUNTER — COMMUNICATION - HEALTHEAST (OUTPATIENT)
Dept: FAMILY MEDICINE | Facility: CLINIC | Age: 61
End: 2021-04-07

## 2021-05-01 ENCOUNTER — HEALTH MAINTENANCE LETTER (OUTPATIENT)
Age: 61
End: 2021-05-01

## 2021-05-24 ENCOUNTER — COMMUNICATION - HEALTHEAST (OUTPATIENT)
Dept: FAMILY MEDICINE | Facility: CLINIC | Age: 61
End: 2021-05-24

## 2021-05-24 ENCOUNTER — RECORDS - HEALTHEAST (OUTPATIENT)
Dept: ADMINISTRATIVE | Facility: CLINIC | Age: 61
End: 2021-05-24

## 2021-05-24 DIAGNOSIS — I10 HTN (HYPERTENSION): ICD-10-CM

## 2021-05-24 RX ORDER — LISINOPRIL AND HYDROCHLOROTHIAZIDE 20; 25 MG/1; MG/1
1 TABLET ORAL DAILY
Qty: 90 TABLET | Refills: 1 | Status: SHIPPED | OUTPATIENT
Start: 2021-05-24 | End: 2021-11-19

## 2021-05-27 ENCOUNTER — RECORDS - HEALTHEAST (OUTPATIENT)
Dept: ADMINISTRATIVE | Facility: CLINIC | Age: 61
End: 2021-05-27

## 2021-05-27 NOTE — TELEPHONE ENCOUNTER
New Appointment Needed  What is the reason for the visit:    The patient was unable to walk a couple weeks ago due to immobility and pain in his right knee.  The patient's knee is better now but still has a twinge that is bothering him.  Provider Preference: PCP only  How soon do you need to be seen?: The patient has open availability next week (the week of 4/1/19).  Will make anytime work.  [place].  Waitlist offered?: No  Okay to leave a detailed message:  Yes

## 2021-05-27 NOTE — PATIENT INSTRUCTIONS - HE
After return from [facility] contact me via [facility] if the right knee is still bothering and then we will get an MRI.     Aleve 1-2 tabs orally twice daily OR ibuprofen 3-4 tabs three times daily (TAKE WITH FOOD)    Avoid salt  Work on weight loss  Nurse BP check upon return from [facility]

## 2021-05-27 NOTE — PROGRESS NOTES
DIAGNOSIS:  1. Acute pain of right knee     2. Hypertension         PLAN:  Patient Instructions   After return from [facility] contact me via MyChart if the right knee is still bothering and then we will get an MRI.     Aleve 1-2 tabs orally twice daily OR ibuprofen 3-4 tabs three times daily (TAKE WITH FOOD)    Avoid salt  Work on weight loss  Nurse BP check upon return from [facility]          HPI:[id number]  Acute onset of right knee pain 3 weeks ago.  Had a brief period where it seemed better.  Now is swollen. Standing and walking hurt. Achy pain, intermittent, occurs with movement and up to 7/10.    No shortness of breath or cp.     He took his BP med this am.  The BP was good in Dec 2019.  Doesn't salt food much.          Current Outpatient Medications on File Prior to Visit   Medication Sig Dispense Refill     aspirin 81 MG EC tablet Take 81 mg by mouth daily.       cholecalciferol, vitamin D3, 5,000 unit Tab Take 1 tablet by mouth daily.              DOCOSAHEXANOIC ACID/EPA (FISH OIL ORAL) Take 1,200 mg by mouth daily.       lisinopril-hydrochlorothiazide (PRINZIDE,ZESTORETIC) 20-25 mg per tablet TAKE ONE TABLET BY MOUTH ONCE DAILY 90 tablet 2     multivit-minerals/ferrous fum (MULTI VITAMIN ORAL) Take by mouth daily.       rosuvastatin (CRESTOR) 20 MG tablet Take 1 tablet (20 mg total) by mouth every morning. 90 tablet 3     No current facility-administered medications on file prior to visit.        Pmh: reviewed  Psh: reviewed  Allergy:  reviewed      EXAM:    /90   Pulse 70   Temp 97.2  F (36.2  C) (Oral)   Resp 16   Wt (!) 274 lb (124.3 kg)   BMI 39.31 kg/m    GEN:   ALERT, NAD, ORIENTED TIMES THREE  LUNGS:  CTA  COR: RRR without murmur  SKIN: NO RASH , ULCERS OR LESIONS NOTED  MS:  Right knee with  Full rom with mild medial joint line tx.  Suggestive Mattie.  EXT: WITHOUT EDEMA/SWELLING    No results found for this or any previous visit (from the past 168 hour(s)).       Results for orders placed or  performed in visit on 03/17/15   Basic Metabolic Panel   Result Value Ref Range    Sodium 141 136 - 145 mmol/L    Potassium 4.2 3.5 - 5.0 mmol/L    Chloride 103 98 - 107 mmol/L    CO2 27 22 - 31 mmol/L    Anion Gap, Calculation 11 5 - 18 mmol/L    Glucose 91 70 - 125 mg/dL    Calcium 10.0 8.5 - 10.5 mg/dL    BUN 21 8 - 22 mg/dL    Creatinine 0.90 0.70 - 1.30 mg/dL    GFR MDRD Af Amer >60 >60 mL/min/1.73m2    GFR MDRD Non Af Amer >60 >60 mL/min/1.73m2     Results for orders placed or performed in visit on 12/17/18   Comprehensive Metabolic Panel   Result Value Ref Range    Sodium 142 136 - 145 mmol/L    Potassium 4.3 3.5 - 5.0 mmol/L    Chloride 106 98 - 107 mmol/L    CO2 29 22 - 31 mmol/L    Anion Gap, Calculation 7 5 - 18 mmol/L    Glucose 99 70 - 125 mg/dL    BUN 17 8 - 22 mg/dL    Creatinine 0.86 0.70 - 1.30 mg/dL    GFR MDRD Af Amer >60 >60 mL/min/1.73m2    GFR MDRD Non Af Amer >60 >60 mL/min/1.73m2    Bilirubin, Total 0.7 0.0 - 1.0 mg/dL    Calcium 9.6 8.5 - 10.5 mg/dL    Protein, Total 6.5 6.0 - 8.0 g/dL    Albumin 3.9 3.5 - 5.0 g/dL    Alkaline Phosphatase 68 45 - 120 U/L    AST 21 0 - 40 U/L    ALT 26 0 - 45 U/L     Lab Results   Component Value Date    PSA 0.5 12/17/2018    PSA 0.7 12/11/2017    PSA 0.8 03/08/2016

## 2021-05-28 NOTE — TELEPHONE ENCOUNTER
New Appointment Needed  What is the reason for the visit:    Pre-Op Appt Request  When is the surgery? :  6/21/19   Where is the surgery?:   [facility] location   Who is the surgeon? :  Dr. Phillip Crawford  What type of surgery is being done?: Right Knee surgery Arthroscopy  Provider Preference: Eleni  How soon do you need to be seen?: Prefers 5/29 in morning or anytime on 5/30, or late morning early afternoon on 5/31.  Waitlist offered?: No  Okay to leave a detailed message: Yes

## 2021-05-28 NOTE — TELEPHONE ENCOUNTER
Only reserved or INF slots available on dates requested and nothing before then either.      Would you want patient worked in?  Ok to use reserve or INF?

## 2021-05-28 NOTE — TELEPHONE ENCOUNTER
Please let Carl know that he has a tear of his right knee  medial meniscus with a displaced fragment.  He will need to see [facility].   I have placed a referral.

## 2021-05-28 NOTE — TELEPHONE ENCOUNTER
Orders being requested: MRI-   Right knee    Reason service is needed/diagnosis:  Right knee pain  When are orders needed by:  Today   Where to send Orders: Fax: [phone]  Okay to leave detailed message?  Yes

## 2021-05-29 NOTE — PATIENT INSTRUCTIONS - HE
PRE-OP LABS    EKG READ AND REVIEWED WITH PT    SCHEDULE PRE- OP  NUCLEAR STRESS TEST  Due to h/o mild to moderate coronary artery disease previously incidentally noted on a CT.

## 2021-05-30 VITALS — WEIGHT: 264.5 LBS | BODY MASS INDEX: 37.95 KG/M2

## 2021-05-30 VITALS — BODY MASS INDEX: 37.69 KG/M2 | WEIGHT: 262.7 LBS

## 2021-05-31 VITALS — BODY MASS INDEX: 37.51 KG/M2 | WEIGHT: 262 LBS | HEIGHT: 70 IN

## 2021-06-01 VITALS — WEIGHT: 264.7 LBS | BODY MASS INDEX: 37.89 KG/M2

## 2021-06-02 VITALS — WEIGHT: 264 LBS | BODY MASS INDEX: 37.79 KG/M2

## 2021-06-02 VITALS — BODY MASS INDEX: 37.94 KG/M2 | HEIGHT: 70 IN | WEIGHT: 265 LBS

## 2021-06-02 VITALS — BODY MASS INDEX: 37.77 KG/M2 | HEIGHT: 70 IN | WEIGHT: 263.8 LBS

## 2021-06-02 VITALS — WEIGHT: 274 LBS | BODY MASS INDEX: 39.31 KG/M2

## 2021-06-02 NOTE — TELEPHONE ENCOUNTER
Refill Approved    Rx renewed per Medication Renewal Policy. Medication was last renewed on 2/18/19, last OV 5/30/19.    Crystal Blanton, [facility] Triage/Med Refill 10/27/2019     Requested Prescriptions   Pending Prescriptions Disp Refills     lisinopril-hydrochlorothiazide (PRINZIDE,ZESTORETIC) 20-25 mg per tablet [Pharmacy Med Name: LISINOPRIL/HCTZ 20-25MG TAB] 90 tablet 2     Sig: TAKE 1 TABLET BY MOUTH ONCE DAILY       Diuretics/Combination Diuretics Refill Protocol  Passed - 10/27/2019  9:57 AM        Passed - Visit with PCP or prescribing provider visit in past 12 months     Last office visit with prescriber/PCP: 4/1/2019 Honorio Knowles MD OR same dept: 4/1/2019 Honorio Knowles MD OR same specialty: 4/1/2019 Honorio Knowles MD  Last physical: 5/30/2019 Last MTM visit: Visit date not found   Next visit within 3 mo: Visit date not found  Next physical within 3 mo: Visit date not found  Prescriber OR PCP: Honorio Knowles MD  Last diagnosis associated with med order: 1. HTN (hypertension)  - lisinopril-hydrochlorothiazide (PRINZIDE,ZESTORETIC) 20-25 mg per tablet [Pharmacy Med Name: LISINOPRIL/HCTZ 20-25MG TAB]; TAKE 1 TABLET BY MOUTH ONCE DAILY  Dispense: 90 tablet; Refill: 2    If protocol passes may refill for 12 months if within 3 months of last provider visit (or a total of 15 months).             Passed - Serum Potassium in past 12 months      Lab Results   Component Value Date    Potassium 4.6 05/30/2019             Passed - Serum Sodium in past 12 months      Lab Results   Component Value Date    Sodium 139 05/30/2019             Passed - Blood pressure on file in past 12 months     BP Readings from Last 1 Encounters:   05/30/19 125/86             Passed - Serum Creatinine in past 12 months      Creatinine   Date Value Ref Range Status   05/30/2019 0.94 0.70 - 1.30 mg/dL Final

## 2021-06-03 VITALS — BODY MASS INDEX: 38.97 KG/M2 | WEIGHT: 272.2 LBS | HEIGHT: 70 IN

## 2021-06-03 NOTE — PROGRESS NOTES
Assessment/Plan:   1. Hip pain, left  Etiology unclear; Pain also associated with greater trochanter may also have had some bursitis that is improving  - XR Hip Left 2 or More VWS; Future    2. Primary osteoarthritis of left hip  Naproxen (Aleve) Take 1-2 pills every 12 hours as needed for pain or inflammation.   Rest as needed when pain has worsened.     3. Hypertension  Blood pressure was initially 141/99; rechecked it was 126/90. Patient states that it might be borderline at home sometimes also. Talked about potentially increasing his dose of Lisinopril or adding another agent. Went over briefly metabolic syndrome and how HTN is a part of that syndrome. Talked some dietary changes that he could consider. He was open but also sounds like he eats a pretty whole foods type of diet already. Need to monitor BPs at home and at clinic.     Follow up with PCP in 1 month for further evaluation for knee or BP.          Subjective:      Carl Iverson is a 59 y.o. male who presents with left hip pain. Onset of the symptoms was several months ago in the summer: Patient had a meniscal repair from a tear on his right knee.  He was approximately after that surgery that he started to develop left hip pain.  Initially pain was just in the outer lateral aspect of the hip it would be worse with activity and laying down patient states that he had pain no matter which side he laid on while sleeping at nighttime.  Pain would be there with walking.  Over the last couple of weeks he states that pain is actually improved and is gotten better he is no longer having pain at nighttime.  And only minimal pain if you press on the greater trochanter. Denies any pain that radiates into the groin or buttock.   Does not feel like he has pain with walking at this point either. Patient has had no prior hip problems. Previous visits for this problem: none. Evaluation to date: none. Treatment to date: OTC analgesics, which have been somewhat  "effective.    The following portions of the patient's history were reviewed and updated as appropriate: allergies, current medications, past family history, past medical history, past social history, past surgical history and problem list.     Review of Systems  Pertinent items are noted in HPI.     Objective:      BP (!) 141/99 (Patient Site: Left Arm, Patient Position: Sitting, Cuff Size: Adult Large)   Pulse 70   Ht 5' 10\" (1.778 m)   Wt (!) 267 lb 3.2 oz (121.2 kg)   SpO2 94%   BMI 38.34 kg/m    Right hip: normal   Left hip: positives: tenderness over greater trochanter and negatives: FROM  pulses full  Negative FABIR/MOISES testing     Imaging:  X-ray the left hip:Personally reviewed x-rays shows DJD changes, likely chronic and no fracture        "

## 2021-06-04 VITALS
BODY MASS INDEX: 38.25 KG/M2 | HEART RATE: 70 BPM | DIASTOLIC BLOOD PRESSURE: 90 MMHG | OXYGEN SATURATION: 94 % | SYSTOLIC BLOOD PRESSURE: 126 MMHG | WEIGHT: 267.2 LBS | HEIGHT: 70 IN

## 2021-06-04 VITALS
WEIGHT: 264.13 LBS | TEMPERATURE: 96.5 F | RESPIRATION RATE: 16 BRPM | SYSTOLIC BLOOD PRESSURE: 131 MMHG | HEART RATE: 61 BPM | BODY MASS INDEX: 37.81 KG/M2 | DIASTOLIC BLOOD PRESSURE: 85 MMHG | HEIGHT: 70 IN

## 2021-06-04 NOTE — PATIENT INSTRUCTIONS - HE
Shingrix #1 and schedule 2nd dose in 2 months    Fasting labs    Colonoscopy in 2021    The following high BMI interventions were performed this visit: encouragement to exercise and dietary management education, guidance, and counseling

## 2021-06-04 NOTE — PROGRESS NOTES
Assessment:      Healthy male exam.    Encounter Diagnoses   Name Primary?     Obesity due to excess calories without serious comorbidity, unspecified classification Yes     Hypertension                         The diagnosis was confirmed.  The condition is stable/controlled on LISINOPRIL-HCTZ  and no side effects  have been noted.  The appropriate follow up labs  ( CMP )have been ordered  (OR ARE UP TO DATE) and medication refills ordered if requested.        Hyperlipidemia, unspecified hyperlipidemia type                         The diagnosis was confirmed.  The condition is stable/controlled on ROSUVASTATIN and no side effects  have been noted.  The appropriate follow up labs  ( FLP, CMP )have been ordered  (OR ARE UP TO DATE) and medication refills ordered if requested.        Coronary artery disease involving native heart without angina pectoris, unspecified vessel or lesion type, STABLE on statin and ASA, ASX      Adenomatous polyp of colon, unspecified part of colon      Screening PSA (prostate specific antigen)      Need for shingles vaccine      Health care maintenance          Plan:             Shingrix #1 and schedule 2nd dose in 2 months    Fasting labs    Colonoscopy in 2021    The following high BMI interventions were performed this visit: encouragement to exercise and dietary management education, guidance, and counseling         Subjective:      Carl Iverson is a 59 y.o. male who presents for an annual exam. The patient reports that there is not domestic violence in his life.     Healthy Habits:   Regular Exercise: Yes  Sunscreen Use: Yes  Healthy Diet: Yes  Dental Visits Regularly: Yes  Seat Belt: Yes  Sexually active: Yes  Monthly Self Testicular Exams:  Yes  Hemoccults: N/A  Flex Sig: N/A  Colonoscopy: Yes and 11-29-16 and due in Nov 2021  Lipid Profile: Yes  Glucose Screen: Yes  Prevention of Osteoporosis: Yes  Last Dexa: N/A  Guns at Home:  Yes  Gun safe/class:  Yes      Immunization History    Administered Date(s) Administered     DT (pediatric) 01/01/1994     INFLUENZA,RECOMBINANT,INJ,PF QUADRIVALENT 18+YRS 10/05/2018, 11/20/2019     Influenza, inj, historic,unspecified 11/07/2007     Influenza,seasonal, Inj IIV3 11/07/2007     Td, Adult, Absorbed 01/01/2004, 05/09/2007     Td,adult,historic,unspecified 01/01/2004, 05/09/2007     Tdap 09/22/2010     Immunization status: due today, Shingrix #1.    No exam data present    Current Outpatient Medications   Medication Sig Dispense Refill     aspirin 81 MG EC tablet Take 81 mg by mouth daily.       cholecalciferol, vitamin D3, 5,000 unit Tab Take 1 tablet by mouth daily.              DOCOSAHEXANOIC ACID/EPA (FISH OIL ORAL) Take 1,200 mg by mouth daily.       glucosamine-D3-Boswellia serr (OSTEO BI-FLEX, 5-LOXIN,) 1,500-400-100 mg-unit-mg Tab Take by mouth.       lisinopril-hydrochlorothiazide (PRINZIDE,ZESTORETIC) 20-25 mg per tablet Take 1 tablet by mouth daily. 90 tablet 1     multivit-minerals/ferrous fum (MULTI VITAMIN ORAL) Take by mouth daily.       rosuvastatin (CRESTOR) 20 MG tablet Take 1 tablet (20 mg total) by mouth every morning. 90 tablet 3     No current facility-administered medications for this visit.      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  Family History   Problem Relation Age of Onset     No Medical Problems Mother      Cancer Father      Lung cancer Father      No Medical Problems Sister      No Medical Problems Brother      Heart  attack Maternal Grandfather      No Medical Problems Sister      No Medical Problems Brother      Colon cancer Maternal Aunt      Cancer Maternal Aunt         Brain     No Medical Problems Son      No Medical Problems Daughter      Social History     Socioeconomic History     Marital status:      Spouse name: Not on file     Number of children: Not on file     Years of education: Not on file     Highest education level: Not on file   Occupational History     Not on file   Social Needs     Financial resource strain: Not on file     Food insecurity:     Worry: Not on file     Inability: Not on file     Transportation needs:     Medical: Not on file     Non-medical: Not on file   Tobacco Use     Smoking status: Never Smoker     Smokeless tobacco: Never Used   Substance and Sexual Activity     Alcohol use: Yes     Alcohol/week: 10.0 standard drinks     Types: 5 Standard drinks or equivalent, 5 Shots of liquor per week     Drug use: No     Sexual activity: Never   Lifestyle     Physical activity:     Days per week: Not on file     Minutes per session: Not on file     Stress: Not on file   Relationships     Social connections:     Talks on phone: Not on file     Gets together: Not on file     Attends Bahai service: Not on file     Active member of club or organization: Not on file     Attends meetings of clubs or organizations: Not on file     Relationship status: Not on file     Intimate partner violence:     Fear of current or ex partner: Not on file     Emotionally abused: Not on file     Physically abused: Not on file     Forced sexual activity: Not on file   Other Topics Concern     Not on file   Social History Narrative     Not on file       Review of Systems  General:  Denies problem  Eyes: Denies problem  Ears/Nose/Throat: Denies problem  Cardiovascular: Denies problem  Respiratory:  Denies problem  Gastrointestinal:  Denies problem  Genitourinary: Denies problem  Musculoskeletal:  some hip pain and  "calf pain  Skin: Denies problem  Neurologic: Denies problem  Psychiatric: Denies problem  Endocrine: Denies problem  Heme/Lymphatic: Denies problem   Allergic/Immunologic: Denies problem        Objective:     Vitals:    12/18/19 0928   BP: 131/85   Pulse: 61   Resp: 16   Temp: (!) 96.5  F (35.8  C)   TempSrc: Oral   Weight: (!) 264 lb 2 oz (119.8 kg)   Height: 5' 10\" (1.778 m)     Wt Readings from Last 3 Encounters:   12/18/19 (!) 264 lb 2 oz (119.8 kg)   11/20/19 (!) 267 lb 3.2 oz (121.2 kg)   05/30/19 (!) 272 lb 3.2 oz (123.5 kg)      Body mass index is 37.9 kg/m .    Physical  General Appearance: Alert, cooperative, no distress, appears stated age  Head: Normocephalic, without obvious abnormality, atraumatic  Eyes: PERRL, conjunctiva/corneas clear, EOM's intact  Ears: Normal TM's and external ear canals, both ears  Nose: Nares normal, septum midline,mucosa normal, no drainage  Throat: Lips, mucosa, and tongue normal; teeth and gums normal  Neck: Supple, symmetrical, trachea midline, no adenopathy;  thyroid: not enlarged, symmetric, no tenderness/mass/nodules; no carotid bruit or JVD  Back: Symmetric, no curvature, ROM normal, no CVA tenderness  Lungs: Clear to auscultation bilaterally, respirations unlabored  Heart: Regular rate and rhythm, S1 and S2 normal, no murmur, rub, or gallop,  Abdomen: Soft, non-tender, bowel sounds active all four quadrants,  no masses, no organomegaly  Genitourinary: Penis normal. Right testis is descended. Left testis is descended.   PROSTATE SMOOTH SYMMETRIC WITHOUT NODULARITY, TX, OR FIRMNESS  Musculoskeletal: Normal range of motion. No joint swelling or deformity.   Extremities: Extremities normal, atraumatic, no cyanosis or edema  Skin: Skin color, texture, turgor normal, no rashes or lesions  Lymph nodes: Cervical, supraclavicular, and axillary nodes normal  Neurologic: He is alert. He has normal reflexes.   Psychiatric: He has a normal mood and affect.      Results for orders " placed or performed in visit on 12/17/18   Comprehensive Metabolic Panel   Result Value Ref Range    Sodium 142 136 - 145 mmol/L    Potassium 4.3 3.5 - 5.0 mmol/L    Chloride 106 98 - 107 mmol/L    CO2 29 22 - 31 mmol/L    Anion Gap, Calculation 7 5 - 18 mmol/L    Glucose 99 70 - 125 mg/dL    BUN 17 8 - 22 mg/dL    Creatinine 0.86 0.70 - 1.30 mg/dL    GFR MDRD Af Amer >60 >60 mL/min/1.73m2    GFR MDRD Non Af Amer >60 >60 mL/min/1.73m2    Bilirubin, Total 0.7 0.0 - 1.0 mg/dL    Calcium 9.6 8.5 - 10.5 mg/dL    Protein, Total 6.5 6.0 - 8.0 g/dL    Albumin 3.9 3.5 - 5.0 g/dL    Alkaline Phosphatase 68 45 - 120 U/L    AST 21 0 - 40 U/L    ALT 26 0 - 45 U/L     Lab Results   Component Value Date    CKTOTAL 111 12/17/2018     Lab Results   Component Value Date    PSA 0.5 12/17/2018    PSA 0.7 12/11/2017    PSA 0.8 03/08/2016     Lab Results   Component Value Date    CHOL 138 12/17/2018    CHOL 142 07/13/2018    CHOL 158 12/11/2017     Lab Results   Component Value Date    HDL 42 12/17/2018    HDL 51 07/13/2018    HDL 49 12/11/2017     Lab Results   Component Value Date    LDLCALC 81 12/17/2018    LDLCALC 68 07/13/2018    LDLCALC 93 12/11/2017     Lab Results   Component Value Date    TRIG 76 12/17/2018    TRIG 117 07/13/2018    TRIG 81 12/11/2017     Vitamin D, Total (25-Hydroxy)   Date Value Ref Range Status   12/17/2018 72.0 30.0 - 80.0 ng/mL Final      No components found for: CHOLHDL

## 2021-06-05 VITALS
RESPIRATION RATE: 20 BRPM | SYSTOLIC BLOOD PRESSURE: 114 MMHG | TEMPERATURE: 97.8 F | BODY MASS INDEX: 36.28 KG/M2 | HEART RATE: 66 BPM | WEIGHT: 253.4 LBS | DIASTOLIC BLOOD PRESSURE: 83 MMHG | HEIGHT: 70 IN

## 2021-06-05 VITALS
BODY MASS INDEX: 36.25 KG/M2 | WEIGHT: 253.2 LBS | HEIGHT: 70 IN | HEART RATE: 82 BPM | SYSTOLIC BLOOD PRESSURE: 134 MMHG | DIASTOLIC BLOOD PRESSURE: 84 MMHG

## 2021-06-05 VITALS
WEIGHT: 256 LBS | HEIGHT: 70 IN | HEART RATE: 57 BPM | DIASTOLIC BLOOD PRESSURE: 84 MMHG | SYSTOLIC BLOOD PRESSURE: 136 MMHG | BODY MASS INDEX: 36.65 KG/M2

## 2021-06-05 NOTE — TELEPHONE ENCOUNTER
Refill Approved    Rx renewed per Medication Renewal Policy. Medication was last renewed on 1/25/19  OV 12/18/19.    Mel Rogers, [facility] Triage/Med Refill 1/17/2020     Requested Prescriptions   Pending Prescriptions Disp Refills     rosuvastatin (CRESTOR) 20 MG tablet 90 tablet 3     Sig: Take 1 tablet (20 mg total) by mouth every morning.       There is no refill protocol information for this order

## 2021-06-05 NOTE — TELEPHONE ENCOUNTER
Refill Request  Did you contact pharmacy: patient used to use mail order but now he wants to use [facility]  Medication name:   rosuvastatin (CRESTOR) 20 MG tablet 90 tablet 3 1/25/2019 1/25/2020    Sig - Route: Take 1 tablet (20 mg total) by mouth every morning. - Oral    Sent to pharmacy as: rosuvastatin (CRESTOR) 20 MG tablet    E-Prescribing Status: Receipt confirmed by pharmacy (1/25/2019  3:44 PM CST)        Requested Prescriptions      No prescriptions requested or ordered in this encounter     Who prescribed the medication: Dr. Knowles  Requested Pharmacy: [facility]  Is patient out of medication: No.  several days left  Patient notified refills processed in 3 business days:  yes  Okay to leave a detailed message: yes

## 2021-06-06 NOTE — TELEPHONE ENCOUNTER
Pt is scheduled to receive his 2nd shingles vaccine tomorrow, 2/12/2020. Please place order, thank you.

## 2021-06-06 NOTE — TELEPHONE ENCOUNTER
I spoke with the patient and explained the date of 2/16 was 60 days out and 2/18 was 2 calendar months from his last date.  He said he originally got a [employer] message stating he was due 2/12.  I rescheduled his appointment from 2/18 to 2/19 per patient's request.

## 2021-06-06 NOTE — TELEPHONE ENCOUNTER
Who is calling:  Patient   Reason for Call: Calling to state that is very disconcerting to receive several different messages and dates for next injection . Please make sure information is verified and correlated before it is relayed to the patient . Patient was told 4 different dates to return for 2nd injection. Patient will make Nurse only visit for 02/18/20 as stated by PCP.   Date of last appointment with primary care: 12/18/19  Okay to leave a detailed message: Yes

## 2021-06-08 NOTE — PROGRESS NOTES
HPI: medcheck.   Skin lesion right flank for about a few years without change    Current Outpatient Prescriptions on File Prior to Visit   Medication Sig Dispense Refill     aspirin 81 MG EC tablet Take 81 mg by mouth daily.       DOCOSAHEXANOIC ACID/EPA (FISH OIL ORAL) Take 1,200 mg by mouth daily.       [DISCONTINUED] lisinopril-hydrochlorothiazide (PRINZIDE,ZESTORETIC) 20-25 mg per tablet Take 1 tablet by mouth daily. 90 tablet 3     [DISCONTINUED] rosuvastatin (CRESTOR) 20 MG tablet Take 1 tablet (20 mg total) by mouth every morning. 90 tablet 3     [DISCONTINUED] cholecalciferol, vitamin D3, 1,000 unit tablet Take 2,000 Units by mouth daily.       Current Facility-Administered Medications on File Prior to Visit   Medication Dose Route Frequency Provider Last Rate Last Dose     [COMPLETED] iohexol 300 mg iodine/mL injection 20 mL (OMNIPAQUE)  20 mL Oral Once in imaging Aaron Heart MD   20 mL at 02/09/17 1310     [COMPLETED] iohexol 350 mg iodine/mL injection 100 mL (OMNIPAQUE)  100 mL Intravenous Once in imaging Aaron Heart MD   100 mL at 02/09/17 1414       Pmh: reviewed  Psh: reviewed  Allergy:  reviewed      EXAM:    Visit Vitals     /86     Pulse 60     Temp 97.6  F (36.4  C) (Oral)     Resp 16     Wt (!) 264 lb 8 oz (120 kg)     BMI 37.95 kg/m2     BP Readings from Last 3 Encounters:   02/10/17 118/86   10/10/16 130/90   10/07/16 (!) 149/103      GEN:   ALERT, NAD, ORIENTED TIMES THREE  NECK: SUPPLE WITHOUT ADENOPATHY OR THYROMEGALY  LUNGS: CTA  COR: RRR WITHOUT MURMUR  EXT: WITHOUT EDEMA  SKIN:  3 BY 5 MM RAISED ROUGH WELL CIRCUMSCRIBED SKIN LESION RIGHT FLANK      No results found for this or any previous visit (from the past 168 hour(s)).    DIAGNOSIS:  1. Hyperlipidemia  rosuvastatin (CRESTOR) 20 MG tablet    Lipid [place] FASTING    Comprehensive Metabolic Panel   2. HTN (hypertension)  lisinopril-hydrochlorothiazide (PRINZIDE,ZESTORETIC) 20-25 mg per tablet   3. Obesity     4.  Health care maintenance  Vitamin D, Total (25-Hydroxy)   5. Screening PSA (prostate specific antigen)  PSA (Prostatic-Specific Antigen), Annual Screen   6. Seborrheic keratoses         PLAN:  Patient Instructions   Fasting labs    The following high BMI interventions were performed this visit: encouragement to exercise and dietary management education, guidance, and counseling    Come in for a PSA sometime after 3-8-17.   Needs at least 3 days of abstinence prior to lab draw.    To follow up for cryo of the right flank seb k in the future

## 2021-06-08 NOTE — PROGRESS NOTES
[facility] Progress Note    Patient: Carl Iverson  MRN: [id number]  Date of Service: 2/13/2017        Reason for visit      1. 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  2. Slightly over weight. Trying to loose weight.  3. Coronary artery calcification.    Plan     1. RTC p.r.n.  2. EGD this year.  3. Advised to loose weight.    Clinical stage      GIST of duodenum    Staging form: Soft Tissue Sarcoma, AJCC 7th Edition      Clinical stage from 3/12/2010: Stage IA (T1, N0, M0) - Signed by Aaron Heart MD on 2/12/2015      Pathologic: No stage assigned - Unsigned      History     Carl Iverson is a very pleasant 56 y.o. 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 He had resection. It was low risk, so we observed him. He is coming in with scans. Had EGD in 2015 was negative.    Overall doing well. Had a hemangioma removed from the dorsum of his foot.    Past Medical History     Past Medical History:   Diagnosis Date     Cancer     Skin & Abdominal     Hyperlipidemia      Hyperlipidemia      Hypertension            Review of Systems   Constitutional  Constitutional (WDL): All constitutional elements are within defined limits  Neurosensory  Neurosensory (WDL): All neurosensory elements are within defined limits  Cardiovascular  Cardiovascular (WDL): All cardiovascular elements are within defined limits  Pulmonary  Respiratory (WDL): Within Defined Limits  Gastrointestinal  Gastrointestinal (WDL): All gastrointestinal elements are within defined limits  Genitourinary  Genitourinary (WDL): All genitourinary elements are within defined limits  Integumentary  Integumentary (WDL): All integumentary elements are within defined limits  Patient Coping  Patient Coping: Accepting  Accompanied by  Accompanied by: Alone    ECOG performance  status and Distress Assessment      ECOG Performance:    ECOG Performance Status: 0    Distress Assessment  Distress Assessment Score: No distress:     Pain Status  Currently in Pain: No/denies        Vital Signs     Vitals:    02/13/17 1431   BP: 133/81   Pulse: 62   Temp: 98.2  F (36.8  C)   SpO2: 98%       Physical Exam     GENERAL: No acute distress. Cooperative in conversation.   HEENT: Pupils are equal, round and reactive. Oral mucosa is clean and intact. No ulcerations or mucositis noted. No bleeding noted.  RESP:Chest symmetric lungs are clear bilaterally per auscultation. Regular respiratory rate. No wheezes or rhonchi.  CV: Normal S1 S2 Regular, rate and rhythm. No murmurs.  ABD: Nondistended, soft, nontender. Positive bowel sounds. No organomegaly.   EXTREMITIES: No lower extremity edema.   NEURO: Non- focal. Alert and oriented x3.  Cranial nerves appear intact.  PSYCH: Within normal limits. No depression or anxiety.  SKIN: Warm dry intact.    LYMPH NODES: Bilateral cervical, supraclavicular, axillary lymph node examination was done.  Negative for any palpable adenopathy.      Lab Results     Results for orders placed or performed in visit on 02/13/17   HM1 (CBC with Diff)   Result Value Ref Range    WBC 7.0 4.0 - 11.0 thou/uL    RBC 4.96 4.40 - 6.20 mill/uL    Hemoglobin 15.3 14.0 - 18.0 g/dL    Hematocrit 45.2 40.0 - 54.0 %    MCV 91 80 - 100 fL    MCH 30.9 27.0 - 34.0 pg    MCHC 33.8 32.0 - 36.0 g/dL    RDW 13.5 11.0 - 14.5 %    Platelets 176 140 - 440 thou/uL    MPV 9.1 7.0 - 10.0 fL    Neutrophils % 57 50 - 70 %    Lymphocytes % 28 20 - 40 %    Monocytes % 8 2 - 10 %    Eosinophils % 6 0 - 6 %    Basophils % 1 0 - 2 %    Neutrophils Absolute 4.0 2.0 - 7.7 thou/uL    Lymphocytes Absolute 2.0 0.8 - 4.4 thou/uL    Monocytes Absolute 0.6 0.0 - 0.9 thou/uL    Eosinophils Absolute 0.4 0.0 - 0.4 thou/uL    Basophils Absolute 0.1 0.0 - 0.2 thou/uL         Imaging Results     Ct Abdomen Pelvis With Oral With  Iv Contrast    Result Date: 2/9/2017  CT ABDOMEN PELVIS W ORAL W IV CONTRAST 2/9/2017 2:22 PM     INDICATION: LUNG CANCER WITH LIVER METS TECHNIQUE: CT abdomen and pelvis. Multiplanar reformation images (MPR). Dose reduction techniques were used. IV CONTRAST: Iohexol (Omni) 100 mL COMPARISON: 02/10/2015. FINDINGS: LUNG BASES: Coronary artery calcifications. ABDOMEN: Low-density liver which may suggest steatosis. No focal liver lesion. Unremarkable gallbladder, pancreas, adrenals, kidneys and spleen. Stable right renal cyst formation. Nonaneurysmal aorta with mild atherosclerosis. No adenopathy. Small fat-containing umbilical hernia. PELVIS: No free fluid or adenopathy. Mild diverticulosis without diverticulitis. Normal appendix. MUSCULOSKELETAL: Degenerative changes. No new or enlarging focal bony abnormality.     CONCLUSION: 1.  Stable exam. No evidence of metastatic disease.         Aaron Heart MD

## 2021-06-09 NOTE — PATIENT INSTRUCTIONS - HE
Increase prinivil-hctz  20-25 to two tablets daily    Continue home BP checks.    Labs in 3 weeks.    May use tylenol for knee pain up to 1000 mg single dose in am.

## 2021-06-09 NOTE — PROGRESS NOTES
"Carl Iverson is a 60 y.o. male who is being evaluated via a billable video visit.      The patient has been notified of following:     \"This video visit will be conducted via a call between you and your physician/provider. We have found that certain health care needs can be provided without the need for an in-person physical exam.  This service lets us provide the care you need with a video conversation.  If a prescription is necessary we can send it directly to your pharmacy.  If lab work is needed we can place an order for that and you can then stop by our lab to have the test done at a later time.    Video visits are billed at different rates depending on your insurance coverage. Please reach out to your insurance provider with any questions.    If during the course of the call the physician/provider feels a video visit is not appropriate, you will not be charged for this service.\"    Patient has given verbal consent to a Video visit? Yes; send link to [phone]    Will anyone else be joining your video visit? No        Video Start Time: 11:02 AM    Additional provider notes: GENERAL: Healthy, alert and no distress  EYES: Eyes grossly normal to inspection. No discharge or erythema, or obvious scleral/conjunctival abnormalities.  RESP: No audible wheeze, cough, or visible cyanosis.  No visible retractions or increased work of breathing.    NEURO: Cranial nerves grossly intact. Mentation and speech appropriate for age.  PSYCH: Mentation appears normal, affect normal/bright, judgement and insight intact, normal speech and appearance well-groomed     HPI:  LAST FEW TIMES dbp 88-90;   TODAY 154/100 and then 145/105.  Has lost 11 lbs.   Last BP taken 141/94.    Thinks his monitor has been accurate on an [employer] ([employer]).    Typically doesn't add salt to his food. Coffee in the morning. Alcohol a couple drinks at night maybe every other night.   Cigar  Occas.  Exercise level is about 10,000 steps a day.   Walked 2 mi " today with heartrate in the 118-120 range.  No chronic pain.    Left knee bothering him for about 2 months, was 9/10 2 months ago.  Walked this past weekend and the knee is 100 times better.  Presently sittin 0-1 and walking 3/10.  No locking of the knee sleeping well at night.   Every day getting a little better.    BP Readings from Last 3 Encounters:   12/18/19 131/85   11/20/19 126/90   05/30/19 125/86      Results for orders placed or performed in visit on 12/18/19   Comprehensive Metabolic Panel   Result Value Ref Range    Sodium 139 136 - 145 mmol/L    Potassium 4.4 3.5 - 5.0 mmol/L    Chloride 100 98 - 107 mmol/L    CO2 28 22 - 31 mmol/L    Anion Gap, Calculation 11 5 - 18 mmol/L    Glucose 93 70 - 125 mg/dL    BUN 15 8 - 22 mg/dL    Creatinine 0.91 0.70 - 1.30 mg/dL    GFR MDRD Af Amer >60 >60 mL/min/1.73m2    GFR MDRD Non Af Amer >60 >60 mL/min/1.73m2    Bilirubin, Total 1.2 (H) 0.0 - 1.0 mg/dL    Calcium 10.0 8.5 - 10.5 mg/dL    Protein, Total 7.0 6.0 - 8.0 g/dL    Albumin 4.3 3.5 - 5.0 g/dL    Alkaline Phosphatase 67 45 - 120 U/L    AST 22 0 - 40 U/L    ALT 28 0 - 45 U/L     Lab Results   Component Value Date    CHOL 153 12/18/2019    CHOL 138 12/17/2018    CHOL 142 07/13/2018     Lab Results   Component Value Date    HDL 50 12/18/2019    HDL 42 12/17/2018    HDL 51 07/13/2018     Lab Results   Component Value Date    LDLCALC 84 12/18/2019    LDLCALC 81 12/17/2018    LDLCALC 68 07/13/2018     Lab Results   Component Value Date    TRIG 97 12/18/2019    TRIG 76 12/17/2018    TRIG 117 07/13/2018     No components found for: CHOLHDL    Encounter Diagnosis   Name Primary?     Hypertension. Not ideally controlled. Yes         Left knee pain, improving  PLAN:   Increase prinivil-hctz  20-25 to two tablets daily    Continue home BP checks.    Labs in 3 weeks.    Continue conservative care for the knee.      I spent 25 min with patient face-to-face, of which 50% or greater was spent in counseling and coordination  of care in regards to patient's  Hypertension and knee pain. .   Please see plan above       Update me with BPs via MyChart in one week.           Video-Visit Details    Type of service:  Video Visit    Video End Time (time video stopped): 11:32 AM  Originating Location (pt. Location): Home    Distant Location (provider location):  [facility]     Platform used for Video Visit: Heber Knowles MD

## 2021-06-12 NOTE — TELEPHONE ENCOUNTER
Left message to call back for: Carl  Information to relay to patient:  JEANETTE. Please help pt schedule a COVID test for his upcoming surgery at the [facility]. Please schedule on 10/23

## 2021-06-12 NOTE — PROGRESS NOTES
[facility]  [address] [facility] [address]  Dept: [phone]  Dept Fax: [phone]  Primary Provider: Honorio Knowles MD  Pre-op Performing Provider: MARY LOU HENRIQUEZ    PREOPERATIVE EVALUATION:  Today's date: 10/9/2020    Carl Iverson is a 60 y.o. male who presents for a preoperative evaluation.    Surgical Information:  Surgery Details 10/9/2020   Surgery/Procedure: Knee surgery   Surgery Location: [facility]   Surgeon: Neto Astudillo   Surgery Date: 10-29   Time of Surgery: 10:30   Where patient plans to recover: at home with family     Fax number for surgical facility: [phone]  Type of Anesthesia Anticipated: Local with MAC    Subjective     HPI related to upcoming procedure: In April of 2020 was up and down a ladder several times, had significant soreness after that.  Was doing a 2 mile walk/run in July, had more pain after that.  Had an MRI with Ortho that showed a meniscal tear.  Scheduled for arthroscopic repair.  Preop Questions 10/9/2020   Have you ever had a heart attack or stroke? No   Have you ever had surgery on your heart or blood vessels, such as a stent placement, a coronary artery bypass, or surgery on an artery in your head, neck, heart, or legs? No   Do you have chest pain with activity? No   Do you have a history of  heart failure? No   Do you currently have a cold, bronchitis or symptoms of other infection? No   Do you have a cough, shortness of breath, or wheezing? No   Do you or anyone in your family have previous history of blood clots? No   Do you or does anyone in your family have a serious bleeding problem such as prolonged bleeding following surgeries or cuts? No   Have you ever had problems with anemia or been told to take iron pills? No   Have you had any abnormal blood loss such as black, tarry or bloody stools? No   Have you ever had a blood transfusion? No   Are you willing to have a blood transfusion if it  is medically needed before, during, or after your surgery? Yes   Have you or any of your relatives ever had problems with anesthesia? No   Do you have sleep apnea, excessive snoring or daytime drowsiness? No   Do you have any artifical heart valves or other implanted medical devices like a pacemaker, defibrillator, or continuous glucose monitor? No   Do you have artificial joints? No   Are you allergic to latex? No         Patient does not have a Health Care Directive or Living Will: Discussed advance care planning with patient; however, patient declined at this time.    [place] ([place] reviewed:  reviewed - no concerns    HYPERLIPIDEMIA - Patient has a long history of significant Hyperlipidemia requiring medication for treatment with recent good control. Patient reports no problems or side effects with the medication.     HYPERTENSION - Patient has longstanding history of HTN , currently denies any symptoms referable to elevated blood pressure. Specifically denies chest pain, palpitations, dyspnea, orthopnea, PND or peripheral edema. Blood pressure readings have been in normal range. Current medication regimen is as listed below. Patient denies any side effects of medication.       Review of Systems  CONSTITUTIONAL: NEGATIVE for fever, chills, change in weight  INTEGUMENTARY/SKIN: NEGATIVE for worrisome rashes, moles or lesions  EYES: NEGATIVE for vision changes or irritation  ENT/MOUTH: NEGATIVE for ear, mouth and throat problems  RESP: NEGATIVE for significant cough or SOB  BREAST: NEGATIVE for masses, tenderness or discharge  CV: NEGATIVE for chest pain, palpitations or peripheral edema  GI: NEGATIVE for nausea, abdominal pain, heartburn, or change in bowel habits  : NEGATIVE for frequency, dysuria, or hematuria  MUSCULOSKELETAL: NEGATIVE for significant arthralgias or myalgia  NEURO: NEGATIVE for weakness, dizziness or paresthesias  ENDOCRINE: NEGATIVE for temperature intolerance, skin/hair  changes  HEME: NEGATIVE for bleeding problems  PSYCHIATRIC: NEGATIVE for changes in mood or affect    Patient Active Problem List    Diagnosis Date Noted     CAD (coronary artery disease) 07/13/2018     Foot mass, right 10/06/2016     Adenomatous colon polyp 08/24/2016     Hyperlipidemia      Obesity      Hypertension      Back Pain      GIST of duodenum 03/12/2010     Past Medical History:   Diagnosis Date     Cancer (H)     Skin & Abdominal     Hyperlipidemia      Hyperlipidemia      Hypertension      Past Surgical History:   Procedure Laterality Date     ABDOMINAL SURGERY  2010    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     CATARACT EXTRACTION Right      EYE SURGERY       GANGLION CYST EXCISION Right 10/7/2016    Procedure: RIGHT FOOT MASS RESECTION;  Surgeon: Meet Dickson DPM;  Location: [facility];  Service:      TUMOR REMOVAL      from neck non-cancerous)     Current Outpatient Medications   Medication Sig Dispense Refill     aspirin 81 MG EC tablet Take 81 mg by mouth daily.       cholecalciferol, vitamin D3, 5,000 unit Tab Take 1 tablet by mouth daily.              coenzyme Q10 (CO Q-10) 100 mg capsule Take 300 mg by mouth daily.       DOCOSAHEXANOIC ACID/EPA (FISH OIL ORAL) Take 1,200 mg by mouth daily.       lisinopriL-hydrochlorothiazide (PRINZIDE,ZESTORETIC) 20-25 mg per tablet Take 2 tablets by mouth daily. 180 tablet 1     multivit-minerals/ferrous fum (MULTI VITAMIN ORAL) Take by mouth daily.       rosuvastatin (CRESTOR) 20 MG tablet Take 1 tablet (20 mg total) by mouth every morning. 90 tablet 2     No current facility-administered medications for this visit.        No Known Allergies    Social History     Tobacco Use     Smoking status: Never Smoker     Smokeless tobacco: Never Used   Substance Use Topics     Alcohol use: Yes     Alcohol/week: 10.0 standard drinks     Types: 5 Standard drinks or equivalent, 5 Shots of liquor per week      Family History   Problem Relation Age of Onset      "No Medical Problems Mother      Cancer Father      Lung cancer Father      No Medical Problems Sister      No Medical Problems Brother      Heart attack Maternal Grandfather 75     Heart disease Maternal Grandfather      No Medical Problems Sister      No Medical Problems Brother      Colon cancer Maternal Aunt      Cancer Maternal Aunt         Brain     No Medical Problems Son      No Medical Problems Daughter      Stroke Neg Hx      Social History     Substance and Sexual Activity   Drug Use No           Objective   /84 (Patient Site: Left Arm, Patient Position: Sitting, Cuff Size: Adult Regular)   Pulse 82   Ht 5' 10\" (1.778 m)   Wt (!) 253 lb 3.2 oz (114.9 kg)   BMI 36.33 kg/m    Physical Exam    GENERAL APPEARANCE: healthy, alert and no distress     EYES: EOMI,  PERRL     HENT: ear canals and TM's normal and nose and mouth without ulcers or lesions     NECK: no adenopathy, no asymmetry, masses, or scars and thyroid normal to palpation     RESP: lungs clear to auscultation - no rales, rhonchi or wheezes     CV: regular rates and rhythm, normal S1 S2, no S3 or S4 and no murmur, click or rub     ABDOMEN:  soft, nontender, no HSM or masses and bowel sounds normal     MS: extremities normal- no gross deformities noted, no evidence of inflammation in joints, FROM in all extremities; left knee not tested     SKIN: no suspicious lesions or rashes     NEURO: Normal strength and tone, sensory exam grossly normal, mentation intact and speech normal     PSYCH: mentation appears normal. and affect normal/bright     LYMPHATICS: No cervical adenopathy    Recent Labs   Lab Test 07/16/20  1117 12/18/19  1015 05/30/19  1039   HGB  --  16.2 15.5   PLT  --  171 179    139 139   K 3.9 4.4 4.6   CREATININE 0.87 0.91 0.94        PRE-OP Diagnostics:   Recent Results (from the past 24 hour(s))   HM2(CBC w/o Differential)    Collection Time: 10/09/20  1:46 PM   Result Value Ref Range    WBC 5.6 4.0 - 11.0 thou/uL    RBC " 5.05 4.40 - 6.20 mill/uL    Hemoglobin 16.3 14.0 - 18.0 g/dL    Hematocrit 47.7 40.0 - 54.0 %    MCV 94 80 - 100 fL    MCH 32.3 27.0 - 34.0 pg    MCHC 34.2 32.0 - 36.0 g/dL    RDW 11.5 11.0 - 14.5 %    Platelets 191 140 - 440 thou/uL    MPV 8.2 7.0 - 10.0 fL   Electrocardiogram Perform and Read    Collection Time: 10/09/20  7:40 PM   Result Value Ref Range    SYSTOLIC BLOOD PRESSURE      DIASTOLIC BLOOD PRESSURE      VENTRICULAR RATE 74 BPM    ATRIAL RATE 74 BPM    P-R INTERVAL 184 ms    QRS DURATION 96 ms    Q-T INTERVAL 382 ms    QTC CALCULATION (BEZET) 424 ms    P Axis 41 degrees    R AXIS 16 degrees    T AXIS 43 degrees    MUSE DIAGNOSIS       Normal sinus rhythm  Possible Inferior infarct , age undetermined  Abnormal ECG  When compared with ECG of 30-MAY-2019 10:32,  No significant change was found  Confirmed by LINCOLN SOLSI, WYATT LOC: ([id number]) on 10/9/2020 1:52:14 PM       EKG: Normal Sinus Rhythm, possible inferior infarct noted (age undetermined), unchanged from previous tracings (my read)         Assessment & Plan       The proposed surgical procedure is considered INTERMEDIATE risk.    REVISED CARDIAC RISK INDEX   The patient has the following serious cardiovascular risks for perioperative complications:  No serious cardiac risks = 0 points    INTERPRETATION: 0 points: Class I (very low risk - 0.4% complication rate)    1. Tear of lateral cartilage or meniscus of knee, current, left, initial encounter  2. Preop general physical exam  Patient is low risk for complications related to planned procedure.  Would recommend proceed as scheduled without further clinical clarification.  Labs as below are normal.  EKG unchanged from previous as above.  - Basic Metabolic Panel  - HM2(CBC w/o Differential)  - Electrocardiogram Perform and Read    3. Coronary artery disease involving native heart without angina pectoris, unspecified vessel or lesion type  Patient is quite active without any symptoms of chest pain.   He had a previous stress test 1 year ago that was normal, no change in his EKG or symptoms.  - Electrocardiogram Perform and Read    4. Hypertension  Well-controlled on current dose of lisinopril-HCTZ.  - Basic Metabolic Panel  - HM2(CBC w/o Differential)    5. Hyperlipidemia, unspecified hyperlipidemia type  Cholesterol levels have been within goal range on current dose of Crestor.      The patient has the following additional risks and recommendations for perioperative complications:     - No identified additional risk factors other than previously addressed     MEDICATION INSTRUCTIONS:  Patient is to take all scheduled medications on the day of surgery    RECOMMENDATION:  APPROVAL GIVEN to proceed with proposed procedure, without further diagnostic evaluation.    No follow-ups on file.    Signed Electronically by: Patsy Conway MD    Copy of this evaluation report is provided to requesting physician.    [facility] Guidelines    Revised Cardiac Risk Index

## 2021-06-12 NOTE — TELEPHONE ENCOUNTER
Refill Approved    Rx renewed per Medication Renewal Policy. Medication was last renewed on 1/7/20.    Valarie Fan, [facility] Triage/Med Refill 10/21/2020     Requested Prescriptions   Pending Prescriptions Disp Refills     rosuvastatin (CRESTOR) 20 MG tablet [Pharmacy Med Name: Rosuvastatin Calcium Oral Tablet 20 MG] 90 tablet 0     Sig: TAKE 1 TABLET BY MOUTH EVERY MORNING       Statins Refill Protocol (Hmg CoA Reductase Inhibitors) Passed - 10/19/2020 10:13 AM        Passed - PCP or prescribing provider visit in past 12 months      Last office visit with prescriber/PCP: 4/1/2019 Honorio Knowles MD OR same dept: 11/20/2019 Renee Merino CNP OR same specialty: 11/20/2019 Renee Merino CNP  Last physical: 12/18/2019 Last MTM visit: Visit date not found   Next visit within 3 mo: Visit date not found  Next physical within 3 mo: Visit date not found  Prescriber OR PCP: Honorio Knowles MD  Last diagnosis associated with med order: 1. Hyperlipidemia  - rosuvastatin (CRESTOR) 20 MG tablet [Pharmacy Med Name: Rosuvastatin Calcium Oral Tablet 20 MG]; TAKE 1 TABLET BY MOUTH EVERY MORNING  Dispense: 90 tablet; Refill: 0    If protocol passes may refill for 12 months if within 3 months of last provider visit (or a total of 15 months).

## 2021-06-13 NOTE — PROGRESS NOTES
Assessment/Plan:      Right foot pain  Suspicion is for a recurrence of his synovial cyst in this same area.  Deferred further imaging as this was imaged with MRI last time and inconclusive, suggested that orthopedics could possibly perform ultrasound to further characterize what is happening.  Referral to orthopedics placed today.  - Ambulatory referral to Orthopedics        Subjective:       Carl Iverson is a 60 y.o. male who presents for evaluation of pain in his right foot.  Patient states that walking barefoot is painful on the plantar side of the metatarsal heads of his third and fourth toes.  He denies any tingling into the toes, occasionally has a burning sensation when he steps.  There is occasionally some sharp and stabbing pain.  Interestingly, patient had a similar issue in 2016.  He required 2 MRIs for further characterization of the lesion between the metatarsal heads in that same area.  He ultimately had surgery with Dr. Dickson from podiatry on 10/7/2016 and pathology revealed a synovial cyst with surrounding granulation tissue.    Imaging Reviewed:  MRI RIGHT FOOT WITHOUT AND WITH CONTRAST  9/10/2016 1:29 PM  CONCLUSION:  1.  1 x 1 x 2 cm ill-defined infiltrative enhancing lesion in the plantar soft tissues plantar and between the second and third metatarsal heads. Central hypoenhancement could represent ischemia or necrosis. Although this could represent a focal fibrotic   lesion or Gilliland's neuroma, the appearance is atypical. Other considerations again include nonspecific inflammatory or infectious process, fat necrosis, or less likely a vascular/hemangiomatous lesion or infiltrative neoplasm.    The following portions of the patient's history were reviewed and updated as appropriate: allergies, current medications, past medical history, past social history, past surgical history and problem list.      Current Outpatient Medications:      aspirin 81 MG EC tablet, Take 81 mg by mouth daily.,  "Disp: , Rfl:      cholecalciferol, vitamin D3, 5,000 unit Tab, Take 1 tablet by mouth daily.    , Disp: , Rfl:      coenzyme Q10 (CO Q-10) 100 mg capsule, Take 300 mg by mouth daily., Disp: , Rfl:      DOCOSAHEXANOIC ACID/EPA (FISH OIL ORAL), Take 1,200 mg by mouth daily., Disp: , Rfl:      lisinopriL-hydrochlorothiazide (PRINZIDE,ZESTORETIC) 20-25 mg per tablet, Take 1 tablet by mouth daily., Disp: 90 tablet, Rfl: 1     multivit-minerals/ferrous fum (MULTI VITAMIN ORAL), Take by mouth daily., Disp: , Rfl:      rosuvastatin (CRESTOR) 20 MG tablet, TAKE 1 TABLET BY MOUTH EVERY MORNING, Disp: 90 tablet, Rfl: 3    Review of Systems   Pertinent items are noted in HPI.      Objective:      /84 (Patient Site: Left Arm, Patient Position: Sitting, Cuff Size: Adult Regular)   Pulse (!) 57   Ht 5' 10\" (1.778 m)   Wt (!) 256 lb (116.1 kg)   BMI 36.73 kg/m      General appearance: alert, appears stated age and cooperative  Head: Normocephalic, without obvious abnormality, atraumatic  Eyes: Conjunctivae clear, sclerae anicteric  Extremities: Pain over the plantar aspect of the foot with deep palpation between the third and fourth metatarsal heads, possibly some tenderness between the second and third, some dorsal tenderness in the same area as well; no lumps are palpable  Neurologic: Grossly normal, alert and oriented, good historian          "

## 2021-06-13 NOTE — PATIENT INSTRUCTIONS - HE
Colonoscopy due in Nov 2021    Fasting labs    Tetanus booster    Work on weight goal of 240 lbs  The following high BMI interventions were performed this visit: encouragement to exercise and dietary management education, guidance, and counseling

## 2021-06-14 NOTE — PROGRESS NOTES
Assessment/Plan:     1. Hyperlipidemia  - Lipid Cascade  - rosuvastatin (CRESTOR) 20 MG tablet; Take 1 tablet (20 mg total) by mouth every morning.  Dispense: 90 tablet; Refill: 3    2. HTN (hypertension)  - Renal Function Profile  - lisinopril-hydrochlorothiazide (PRINZIDE,ZESTORETIC) 20-25 mg per tablet; Take 1 tablet by mouth daily.  Dispense: 90 tablet; Refill: 3    3. Annual physical exam  - PSA, Annual Screen (Prostatic-Specific Antigen)  - HM1(CBC and Differential)  - Vitamin D, Total (25-Hydroxy)  - HM1 (CBC with Diff)    4. Obesity  Low carb high fat diet: 50-70 grams of carbs in a day, 4 palm sized protein in a day, 4 servings of veggies/day. Whole real foods. Minimize sugars. Good healthy fats; avocados, milk, cheese, full fat yogurt, nuts, natural nut butters.   Diet doctor.tradeNOW given as a resource.   Walk 30 minutes a day or walk 10 minutes 3 times a day.        Subjective:      Carl Iverson is a 57 y.o. male who presents for an annual exam. The patient reports that there is not domestic violence in his life.     Healthy Habits:   Regular Exercise: No  Sunscreen Use: Yes  Healthy Diet: Yes  Dental Visits Regularly: Yes  Seat Belt: Yes  Sexually active: Yes  Monthly Self Testicular Exams:  Yes  Hemoccults: No  Flex Sig: N/A  Colonoscopy: Yes  Done 2016, every 5 years  Lipid Profile: Yes  Glucose Screen: Yes  Prevention of Osteoporosis: N/A  Last Dexa: N/A  Guns at Home:  Yes  Guns Safety Locks:  Yes      Immunization History   Administered Date(s) Administered     DT (pediatric) 01/01/1994     Influenza, inj, historic,unspecified 11/07/2007     Influenza,seasonal, Inj IIV3 11/07/2007     Td, Adult, Absorbed 01/01/2004, 05/09/2007     Td,adult,historic,unspecified 01/01/2004, 05/09/2007     Tdap 09/22/2010     Immunization status: up to date and documented, due today.    No exam data present     Current Outpatient Prescriptions   Medication Sig Dispense Refill     cholecalciferol, vitamin D3, 5,000 unit  Tab Take 2 tablets by mouth daily.       aspirin 81 MG EC tablet Take 81 mg by mouth daily.       cholecalciferol, vitamin D3, 2,000 unit Tab Take 2 tablets by mouth daily.       DOCOSAHEXANOIC ACID/EPA (FISH OIL ORAL) Take 1,200 mg by mouth daily.       lisinopril-hydrochlorothiazide (PRINZIDE,ZESTORETIC) 20-25 mg per tablet Take 1 tablet by mouth daily. 90 tablet 3     rosuvastatin (CRESTOR) 20 MG tablet Take 1 tablet (20 mg total) by mouth every morning. 90 tablet 3     No current facility-administered medications for this visit.      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  Family History   Problem Relation Age of Onset     No Medical Problems Mother      Cancer Father      Lung cancer Father      No Medical Problems Sister      No Medical Problems Brother      Heart attack Maternal Grandfather      No Medical Problems Sister      No Medical Problems Brother      Colon cancer Maternal Aunt      Cancer Maternal Aunt      Brain     No Medical Problems Son      No Medical Problems Daughter      Social History     Social History     Marital status:      Spouse name: N/A     Number of children: N/A     Years of education: N/A     Occupational History     Not on file.     Social History Main Topics     Smoking status: Never Smoker     Smokeless tobacco: Never Used     Alcohol use 6.0 oz/week     5 Standard drinks or equivalent, 5 Shots of liquor per week     Drug use: No     Sexual  "activity: No     Other Topics Concern     Not on file     Social History Narrative       Review of Systems  Review of Systems   Genitourinary:        Loss of sexual drive   All other systems reviewed and are negative.            Objective:     Vitals:    12/11/17 0805   BP: 124/84   Pulse: 76   Resp: 12   Temp: 98.3  F (36.8  C)   TempSrc: Oral   Weight: (!) 262 lb (118.8 kg)   Height: 5' 10.08\" (1.78 m)     Body mass index is 37.51 kg/(m^2).    Physical  Physical Exam     General Appearance:  Alert, cooperative, no distress, appears stated age   Head:  Normocephalic, without obvious abnormality, atraumatic   Eyes:  PERRL, conjunctiva/corneas clear, EOM's intact   Ears:  Normal TM's and external ear canals, both ears   Nose: Nares normal, septum midline, mucosa normal, no drainage   Throat: Lips, mucosa, and tongue normal; teeth and gums normal   Neck: Supple, symmetrical, trachea midline, no adenopathy, thyroid: not enlarged, symmetric, no tenderness/mass/nodules   Back:   Symmetric, no curvature, ROM normal, no CVA tenderness   Lungs:   Clear to auscultation bilaterally, respirations unlabored   Chest Wall:  No tenderness or deformity   Heart:  Regular rate and rhythm, S1, S2 normal, no murmur, rub or gallop   Abdomen:   Soft, non-tender, bowel sounds active all four quadrants,  no masses, no organomegaly   Extremities: Extremities normal, atraumatic, no cyanosis or edema   Skin: Skin color, texture, turgor normal, no rashes, has skin tag on left temple, has 4 seborrheic keratosis lesions on posterior back.   Lymph nodes: Cervical and supraclavicular normal   Neurologic: Normal,Coordinated, smooth gait, balance, rapid alternating movements, sensory functioning, and cranial nerves II-XII grossly intact. DTR's+2 bilaterally brachioradialis, knee, ankle.              "

## 2021-06-16 PROBLEM — E66.01 MORBID OBESITY (H): Status: ACTIVE | Noted: 2020-11-17

## 2021-06-16 PROBLEM — I25.10 CAD (CORONARY ARTERY DISEASE): Status: ACTIVE | Noted: 2018-07-13

## 2021-06-17 NOTE — TELEPHONE ENCOUNTER
Reason for Call:  Medication or medication refill:    Do you use a [facility]?  Name of the pharmacy and phone number for the current request: [facility] [address] [facility], [place] - [address]    Name of the medication requested: lisinopriL-hydrochlorothiazide (PRINZIDE,ZESTORETIC) 20-25 mg per tablet    Other request: please update chart- pt had J&J covid vaccine on 5/23/21 Lot#[id number]    Can we leave a detailed message on this number? Yes    Phone number patient can be reached at:   Cell number on file:    Telephone Information:   Mobile [phone]       Best Time: na    Call taken on 5/24/2021 at 10:11 AM by Azeb Ng

## 2021-06-19 NOTE — LETTER
Letter by Honorio Knowles MD at      Author: Honorio Knowles MD Service: -- Author Type: --    Filed:  Encounter Date: 6/3/2019 Status: (Other)         Carl Iverson  [address] [address] [address]             [place] 3, 2019         Dear Mr. Iverson,    Below are the results from your recent visit:    Resulted Orders   NM Exercise Stress Test   Result Value Ref Range    Pharmacologic Protocol  [place]     Test Type Treadmill     Baseline HR 65     Baseline /92     Last Stress      Last Stress /80     Target      PERCENT HR 85%     Exercise duration (min) 8     Exercise duration (sec) 58     Estimated workload 10.3     Exercise Stage Reached  Stage 3     ST Deviation Elevation V3 0.6mm     Deviation Time V6 -0.8mm     ST Elevation Amount V2 0.8mm     ST Deviation Amount he V6 -0.7mm     Final Resting /86     Final Resting HR 90     Max Treadmill Speed 3.4     Max Treadmill Grade 14.0     Peak Systolic /80     Peak Diastolic /86     Max      Stress Phase Resting     Stress Resting Pt Position STANDING     Current HR 65     Current /92     Stress Phase Resting     Stress Resting Pt Position MANUAL EVENT     Current      Current /80     Stress Phase Stress     Stage Minute EXE 00:00     Exercise Stage STAGE 1     Current HR 73     Current /88     Stress Phase Stress     Stage Minute EXE 01:00     Exercise Stage STAGE 1     Current      Current /88     Stress Phase Stress     Stage Minute EXE 01:43     Exercise Stage STAGE 1     Current      Current /82     Stress Phase Stress     Stage Minute EXE 02:00     Exercise Stage STAGE 1     Current      Current /82     Stress Phase Stress     Stage Minute EXE 03:00     Exercise Stage STAGE 2     Current      Current /82     Stress Phase Stress     Stage Minute EXE 04:00     Exercise Stage STAGE 2     Current      Current /82      Stress Phase Stress     Stage Minute EXE 04:42     Exercise Stage STAGE 2     Current      Current /80     Stress Phase Stress     Stage Minute EXE 05:00     Exercise Stage STAGE 2     Current      Current /80     Stress Phase Stress     Stage Minute EXE 06:00     Exercise Stage STAGE 3     Current      Current /80     Stress Phase Stress     Stage Minute EXE 07:00     Exercise Stage STAGE 3     Current      Current /80     Stress Phase Stress     Stage Minute EXE 07:07     Exercise Stage STAGE 3     Current      Current /80     Stress Phase Stress     Stage Minute EXE 07:58     Exercise Stage STAGE 3     Current      Current /80     Stress Phase Stress     Stage Minute EXE 08:00     Exercise Stage STAGE 3     Current      Current /80     Stress Phase Stress     Stage Minute EXE 08:58     Exercise Stage STAGE 3     Current      Current /80     Stress Phase Recovery     Stage Minute REC 00:01     Exercise Stage Recovery     Current      Current /80     Stress Phase Recovery     Stage Minute REC 00:18     Exercise Stage Recovery     Current      Current /80     Stress Phase Recovery     Stage Minute REC 00:31     Exercise Stage Recovery     Current      Current /76     Stress Phase Recovery     Stage Minute REC 01:01     Exercise Stage Recovery     Current      Current /76     Stress Phase Recovery     Stage Minute REC 01:17     Exercise Stage Recovery     Current      Current /80     Stress Phase Recovery     Stage Minute REC 02:01     Exercise Stage Recovery     Current HR 95     Current /80     Stress Phase Recovery     Stage Minute REC 03:01     Exercise Stage Recovery     Current HR 99     Current /80     Stress Phase Recovery     Stage Minute REC 03:18     Exercise Stage Recovery     Current HR 91     Current /86     Stress Phase Recovery      Stage Minute REC 04:01     Exercise Stage Recovery     Current HR 96     Current /86     Stress Phase Recovery     Stage Minute REC 05:01     Exercise Stage Recovery     Current HR 90     Current /86     Stress Phase Recovery     Stage Minute REC 05:26     Exercise Stage Recovery     Current HR 89     Current /86     Stress Phase Recovery     Stage Minute REC 06:01     Exercise Stage Recovery     Current HR 91     Current /86     Stress Phase Recovery     Stage Minute REC 06:18     Exercise Stage Recovery     Current HR 90     Current /86     Calculated Percent HR 91 %    Left Ventricular EF 70 %    Narrative      The exercise nuclear stress test is negative for inducible myocardial   ischemia or infarction.    The patient's exercise capacity is normal.    The left ventricular ejection fraction is 70%.    There is no prior study available.        Mohan Harris:  Your stress test is NORMAL.  Proceed with surgery as planned.    Please call with questions or contact us using [employer].    Sincerely,        Electronically signed by Honorio Knowles MD

## 2021-06-19 NOTE — PROGRESS NOTES
DIAGNOSIS:  1. Hyperlipidemia  Lipid Cascade FASTING    ALT (SGPT)   2. CAD (coronary artery disease)         PLAN:  Patient Instructions   Resume aspirin 81 mg daily    Home BP checks    Nurse BP check in one week    Check lipids and ALT today  (consider going up on Crestor)    Follow up with Cards (Dr Carson)            HPI:  Follow up meds.  Has been off aspirin for 6 months of his own accord.  No chest pain or shortness of breath.  Walks regularly at work and 8i-12,000 steps a day.  Non-smoker (cigar now and then)        Current Outpatient Prescriptions on File Prior to Visit   Medication Sig Dispense Refill     cholecalciferol, vitamin D3, 5,000 unit Tab Take 2 tablets by mouth daily.       DOCOSAHEXANOIC ACID/EPA (FISH OIL ORAL) Take 1,200 mg by mouth daily.       lisinopril-hydrochlorothiazide (PRINZIDE,ZESTORETIC) 20-25 mg per tablet Take 1 tablet by mouth daily. 90 tablet 3     rosuvastatin (CRESTOR) 20 MG tablet Take 1 tablet (20 mg total) by mouth every morning. 90 tablet 3     aspirin 81 MG EC tablet Take 81 mg by mouth daily.       cholecalciferol, vitamin D3, 2,000 unit Tab Take 2 tablets by mouth daily.       No current facility-administered medications on file prior to visit.        Pmh: reviewed  Psh: reviewed  Allergy:  reviewed      EXAM:    BP (!) 140/96 (Patient Site: Left Arm, Patient Position: Sitting, Cuff Size: Adult Large)  Pulse (!) 56  Temp 98.1  F (36.7  C) (Oral)   Resp 16  Wt (!) 264 lb 11.2 oz (120.1 kg)  BMI 37.89 kg/m2   BP Readings from Last 3 Encounters:   07/13/18 (!) 139/92   12/11/17 124/84   02/13/17 133/81      GEN:   ALERT, NAD, ORIENTED TIMES THREE  NECK:  SUPPLE WITHOUT  ADENOPATHY OR THYROMEGALY  LUNGS: CTA  COR: RRR WITHOUT MURMURS  SKIN: NO RASH , ULCERS OR LESIONS NOTED   EXT: WITHOUT EDEMA OR SWELLING    No results found for this or any previous visit (from the past 168 hour(s)).       Lab Results   Component Value Date    PSA 0.7 12/11/2017    PSA 0.8  03/08/2016    PSA 0.6 04/19/2011     Results for orders placed or performed in visit on 03/17/15   Basic Metabolic Panel   Result Value Ref Range    Sodium 141 136 - 145 mmol/L    Potassium 4.2 3.5 - 5.0 mmol/L    Chloride 103 98 - 107 mmol/L    CO2 27 22 - 31 mmol/L    Anion Gap, Calculation 11 5 - 18 mmol/L    Glucose 91 70 - 125 mg/dL    Calcium 10.0 8.5 - 10.5 mg/dL    BUN 21 8 - 22 mg/dL    Creatinine 0.90 0.70 - 1.30 mg/dL    GFR MDRD Af Amer >60 >60 mL/min/1.73m2    GFR MDRD Non Af Amer >60 >60 mL/min/1.73m2     Lab Results   Component Value Date    CREATININE 0.93 12/11/2017    BUN 19 12/11/2017     12/11/2017    K 4.2 12/11/2017     12/11/2017    CO2 26 12/11/2017     Lab Results   Component Value Date    CHOL 158 12/11/2017    CHOL 149 02/10/2017    CHOL 147 03/08/2016     Lab Results   Component Value Date    HDL 49 12/11/2017    HDL 48 02/10/2017    HDL 48 03/08/2016     Lab Results   Component Value Date    LDLCALC 93 12/11/2017    LDLCALC 81 02/10/2017    LDLCALC 79 03/08/2016     Lab Results   Component Value Date    TRIG 81 12/11/2017    TRIG 98 02/10/2017    TRIG 100 03/08/2016     Vitamin D, Total (25-Hydroxy)   Date Value Ref Range Status   12/11/2017 70.2 30.0 - 80.0 ng/mL Final      No components found for: CHOLHDL

## 2021-06-19 NOTE — LETTER
Letter by Honorio Knowles MD at      Author: Honorio Knowles MD Service: -- Author Type: --    Filed:  Encounter Date: 5/30/2019 Status: (Other)         Carl Iverson  [address] [facility] [address]             May 30, 2019         Dear Mr. Iverson,    Below are the results from your recent visit:    Resulted Orders   Basic Metabolic Panel   Result Value Ref Range    Sodium 139 136 - 145 mmol/L    Potassium 4.6 3.5 - 5.0 mmol/L    Chloride 103 98 - 107 mmol/L    CO2 28 22 - 31 mmol/L    Anion Gap, Calculation 8 5 - 18 mmol/L    Glucose 86 70 - 125 mg/dL    Calcium 10.1 8.5 - 10.5 mg/dL    BUN 18 8 - 22 mg/dL    Creatinine 0.94 0.70 - 1.30 mg/dL    GFR MDRD Af Amer >60 >60 mL/min/1.73m2    GFR MDRD Non Af Amer >60 >60 mL/min/1.73m2    Narrative    Fasting Glucose reference range is 70-99 mg/dL per  American Diabetes Association (ADA) guidelines.   HM1 (CBC with Diff)   Result Value Ref Range    WBC 5.0 4.0 - 11.0 thou/uL    RBC 4.83 4.40 - 6.20 mill/uL    Hemoglobin 15.5 14.0 - 18.0 g/dL    Hematocrit 44.8 40.0 - 54.0 %    MCV 93 80 - 100 fL    MCH 32.1 27.0 - 34.0 pg    MCHC 34.6 32.0 - 36.0 g/dL    RDW 11.8 11.0 - 14.5 %    Platelets 179 140 - 440 thou/uL    MPV 8.2 7.0 - 10.0 fL    Neutrophils % 51 50 - 70 %    Lymphocytes % 34 20 - 40 %    Monocytes % 11 (H) 2 - 10 %    Eosinophils % 4 0 - 6 %    Basophils % 1 0 - 2 %    Neutrophils Absolute 2.5 2.0 - 7.7 thou/uL    Lymphocytes Absolute 1.7 0.8 - 4.4 thou/uL    Monocytes Absolute 0.6 0.0 - 0.9 thou/uL    Eosinophils Absolute 0.2 0.0 - 0.4 thou/uL    Basophils Absolute 0.0 0.0 - 0.2 thou/uL       Mohan Henry:  All your pre-op labs are normal.  We will fax them to pre-op.  Then we are just waiting on your nuclear stress test (which if necessary they can convert to the pharmacologic nuclear stress test.    Please call with questions or contact us using [employer].    Sincerely,        Electronically signed by Honorio Knowles MD

## 2021-06-20 NOTE — LETTER
Letter by Honorio Knowles MD at      Author: Honorio Knowles MD Service: -- Author Type: --    Filed:  Encounter Date: 12/18/2019 Status: Signed         Carl Iverson  [address] [facility] [address]             December 18, 2019         Dear Mr. Iverson,    Below are the results from your recent visit:    Resulted Orders   Lipid [place] FASTING   Result Value Ref Range    Cholesterol 153 <=199 mg/dL    Triglycerides 97 <=149 mg/dL    HDL Cholesterol 50 >=40 mg/dL    LDL Calculated 84 <=129 mg/dL    Patient Fasting > 8hrs? Yes    Comprehensive Metabolic Panel   Result Value Ref Range    Sodium 139 136 - 145 mmol/L    Potassium 4.4 3.5 - 5.0 mmol/L    Chloride 100 98 - 107 mmol/L    CO2 28 22 - 31 mmol/L    Anion Gap, Calculation 11 5 - 18 mmol/L    Glucose 93 70 - 125 mg/dL    BUN 15 8 - 22 mg/dL    Creatinine 0.91 0.70 - 1.30 mg/dL    GFR MDRD Af Amer >60 >60 mL/min/1.73m2    GFR MDRD Non Af Amer >60 >60 mL/min/1.73m2    Bilirubin, Total 1.2 (H) 0.0 - 1.0 mg/dL    Calcium 10.0 8.5 - 10.5 mg/dL    Protein, Total 7.0 6.0 - 8.0 g/dL    Albumin 4.3 3.5 - 5.0 g/dL    Alkaline Phosphatase 67 45 - 120 U/L    AST 22 0 - 40 U/L    ALT 28 0 - 45 U/L    Narrative    Fasting Glucose reference range is 70-99 mg/dL per  American Diabetes Association (ADA) guidelines.   PSA, Annual Screen (Prostatic-Specific Antigen)   Result Value Ref Range    PSA 1.5 0.0 - 3.5 ng/mL    Narrative    Method is Abbott Prostate-Specific Antigen (PSA)  Standard-WHO 1st International (90:10)   CK Total   Result Value Ref Range    CK, Total 73 30 - 190 U/L   HM1 (CBC with Diff)   Result Value Ref Range    WBC 5.3 4.0 - 11.0 thou/uL    RBC 5.04 4.40 - 6.20 mill/uL    Hemoglobin 16.2 14.0 - 18.0 g/dL    Hematocrit 47.2 40.0 - 54.0 %    MCV 94 80 - 100 fL    MCH 32.1 27.0 - 34.0 pg    MCHC 34.3 32.0 - 36.0 g/dL    RDW 12.1 11.0 - 14.5 %    Platelets 171 140 - 440 thou/uL    MPV 8.5 7.0 - 10.0 fL    Neutrophils % 58 50 - 70 %     Lymphocytes % 27 20 - 40 %    Monocytes % 10 2 - 10 %    Eosinophils % 5 0 - 6 %    Basophils % 1 0 - 2 %    Neutrophils Absolute 3.1 2.0 - 7.7 thou/uL    Lymphocytes Absolute 1.4 0.8 - 4.4 thou/uL    Monocytes Absolute 0.5 0.0 - 0.9 thou/uL    Eosinophils Absolute 0.2 0.0 - 0.4 thou/uL    Basophils Absolute 0.0 0.0 - 0.2 thou/uL       Mohan Harris:  As we discussed your PSA increase by a full point in the past year.  Anything over an increase of 0.75 warrants a consultation with Urology.   I have placed a referral for you to see Dr Camp or Dr Armas ([phone]).    Your cholesterol panel is excellent and the remaining labs are NORMAL.      Dr Knowles      Please call with questions or contact us using [facility].    Sincerely,        Electronically signed by Honorio Knowles MD

## 2021-06-20 NOTE — PROGRESS NOTES
ASSESSMENT/PLAN  1. Flu vaccine need  Will update with flu vaccine today  - Influenza, Recombinant, Inj, Quadrivalent, PF, 18+YRS    2. Left sided abdominal pain  Suspect muscle/skeletal injury causing symptoms- discussed monitoring at this time which he is in agreement  Will contact me if having more pain, or symptoms change - or if they are not improving in the next couple of weeks  Any radiation of symptoms to groin we will further evaluate with imaging for stone     3. Mixed hyperlipidemia  Pt inquiring about possibly decreasing statin- discussed his values and would recommend continued use at current dose  He is not having side effects of the medication but was wondering if it could be lowered  Reviewed imaging that he has had in the past showing coronary calcifications- discussed that his LDL is around 70 and I would continue    4. Essential hypertension  BP not at goal range- patient doesn't feel his BP has been running typically at that high of a level  He would like to maintain on current dosing and monitor  Discussed new goals of 130/80 or less        SUBJECTIVE:   Chief Complaint   Patient presents with     Flank Pain     Pain on the left side after getting a massage x 3 weeks     Medication Management     wanting to know if patient can go to 10 mg on the crestor since LDL was 68     Carl Iverson 58 y.o. male    Current Outpatient Prescriptions   Medication Sig Dispense Refill     aspirin 81 MG EC tablet Take 81 mg by mouth daily.       cholecalciferol, vitamin D3, 5,000 unit Tab Take 2 tablets by mouth daily.       DOCOSAHEXANOIC ACID/EPA (FISH OIL ORAL) Take 1,200 mg by mouth daily.       lisinopril-hydrochlorothiazide (PRINZIDE,ZESTORETIC) 20-25 mg per tablet Take 1 tablet by mouth daily. 90 tablet 3     rosuvastatin (CRESTOR) 20 MG tablet Take 1 tablet (20 mg total) by mouth every morning. 90 tablet 3     No current facility-administered medications for this visit.      Allergies: Review of patient's  "allergies indicates no known allergies.   No LMP for male patient.    HPI:   Pt is here for evaluation of left side and abdomen irritation.  It's a dull sensation- little pain -but has been present coming and going for the last couple of weeks since he had a massage.  He heard a pop during the massage- and has been having symptoms since- no problems with fevers or chills.  No radiation of symptoms into the groin- no changes or problems with urination.  Abd is soft, area affected is in front of the flank and coming around left lateral side around the 10th rib area- discussed suspect muscular injury and I would just monitor at this time for the next couple of weeks to see if it improves on its own or not- if not or new developing symptoms we can start with imaging- he is in agreement with that plan.  He would like to be updated with a flu vaccine today.    He inquires about his cholesterol medication and whether he could lower the dosing- he is not having problems with the medication but doesn't want to be taking too much if he doesn't have too- we reviewed his PMH and he has not had coronary stents but imaging prior has indicated coronary calcifications - with this we discussed trying to maintain on his current dosing as his values are at goal and LDL is around 70.  He takes blood pressure medication and his BP is elevated today- he doesn't feel this is typical for him- we discussed the new recommendations of 130/80 with him -but he would like to maintain on his current dosing and monitor.    ROS: negative except as per HPI    OBJECTIVE:   The patient appears well, alert, oriented x 3, in no distress.  /88 (Patient Site: Left Arm, Patient Position: Sitting, Cuff Size: Adult Large)  Pulse 71  Temp 97.4  F (36.3  C) (Oral)   Resp 18  Ht 5' 10\" (1.778 m)  Wt (!) 265 lb (120.2 kg)  BMI 38.02 kg/m2    HEENT:  Nose/mouth moist, no erythema/lesions. Neck supple. No adenopathy or thyromegaly. TM's normal " BL  Lungs: clear, good air entry, no wheezes, rhonchi or rales.   Cardiac: S1 and S2 normal, no murmurs, regular rate and rhythm.   Abdomen: normal bowel sounds, soft, no guarding or rebound- no radiation of pain to the groin- pain around left lateral side of LUQ of the abdomen  Extremities: show no edema, normal peripheral pulses.   Neurological: normal, no focal findings.  Skin: clear, dry, no rashes/lesions  Psych- normal mood and affect      Pt states an understanding and agrees to the above plan.

## 2021-06-20 NOTE — PROGRESS NOTES
ASSESSMENT/PLAN  1. Right foot pain  Patient presenting with right-sided foot pain over the last 4 days which is essentially resolved on its own  Pain had encompassed the top of the metatarsals on the right foot and down the plantar fascia area  He could visibly see some swelling and some erythema  With ibuprofen use it has improved and almost dissipated back to baseline-with the swelling and redness that he saw suspicious for gout  Patient is never had gout prior but was occurring while a lot of hiking is been done in [place] on a trip and he was eating seafood  With resolution of symptoms at this time discussed with him the possibility of gout and arthritis  Would monitor at this point but if recurrent flares occurred consideration for changing blood pressure medication around such as hydrochlorothiazide  Counseled the patient on steady protein intake and increasing hydration to avoid further flares        SUBJECTIVE:   Chief Complaint   Patient presents with     Foot Pain     c/o right foot pain X4 days, slight swelling, tender to touch, no known injury - previous foot surgery     Carl Iverson 58 y.o. male    Current Outpatient Prescriptions   Medication Sig Dispense Refill     aspirin 81 MG EC tablet Take 81 mg by mouth daily.       cholecalciferol, vitamin D3, 5,000 unit Tab Take 2 tablets by mouth daily.       DOCOSAHEXANOIC ACID/EPA (FISH OIL ORAL) Take 1,200 mg by mouth daily.       lisinopril-hydrochlorothiazide (PRINZIDE,ZESTORETIC) 20-25 mg per tablet Take 1 tablet by mouth daily. 90 tablet 3     rosuvastatin (CRESTOR) 20 MG tablet Take 1 tablet (20 mg total) by mouth every morning. 90 tablet 3     cholecalciferol, vitamin D3, 2,000 unit Tab Take 2 tablets by mouth daily.       No current facility-administered medications for this visit.      Allergies: Review of patient's allergies indicates no known allergies.   No LMP for male patient.    HPI:   Patient presenting with right-sided foot pain suddenly  occurred while traveling in [place]  He was doing a lot of hiking and eating a seafood while traveling and started having some pain on the anterior portion of the metatarsal region across his right foot he started noticing some visible swelling as well that his plantar fascia started hurting afterwards  Discussed on the possibility of some arthritic irritation due to the amount of hiking that he was doing but also the large possibility with inflammation and redness and pain of gout  Is never had gout prior and symptoms have alleviated now with some hydration and ibuprofen use  I discussed with him in 2 different options and I discussed monitoring at this point which is in agreement  I discussed with him the possibility of altering blood pressure medications if gout recurs with his hydrochlorothiazide    Patient is in agreement to plan    No patient's blood pressure slightly elevated we discussed dietary changes to work on weight loss to help lower his diastolic value    ROS: negative except as per HPI    OBJECTIVE:   The patient appears well, alert, oriented x 3, in no distress.  /87 (Patient Site: Left Arm, Patient Position: Sitting, Cuff Size: Adult Large)  Pulse 67  Temp 97.7  F (36.5  C) (Oral)   Resp 16  Wt (!) 264 lb (119.7 kg)  BMI 37.79 kg/m2    Lungs: clear, good air entry, no wheezes, rhonchi or rales.   Cardiac: S1 and S2 normal, no murmurs, regular rate and rhythm.   Abdomen: normal bowel sounds, soft, BMI of 37  Extremities: show no edema, normal peripheral pulses.  No palpable tenderness on exam, plantar fascia is not tender when patient does stand he does have some degree of pes planus  Neurological: normal, no focal findings.  Skin: clear, dry, no rashes/lesions  Psych- normal mood and affect      Pt states an understanding and agrees to the above plan.

## 2021-06-20 NOTE — LETTER
Letter by Honorio Knowles MD at      Author: Honorio Knowles MD Service: -- Author Type: --    Filed:  Encounter Date: 7/16/2020 Status: (Other)         Carl Iverson  [address] [facility] [address]             July 16, 2020         Dear Mr. Iverson,    Below are the results from your recent visit:    Resulted Orders   Basic Metabolic Panel   Result Value Ref Range    Sodium 139 136 - 145 mmol/L    Potassium 3.9 3.5 - 5.0 mmol/L    Chloride 102 98 - 107 mmol/L    CO2 28 22 - 31 mmol/L    Anion Gap, Calculation 9 5 - 18 mmol/L    Glucose 89 70 - 125 mg/dL    Calcium 9.8 8.5 - 10.5 mg/dL    BUN 18 8 - 22 mg/dL    Creatinine 0.87 0.70 - 1.30 mg/dL    GFR MDRD Af Amer >60 >60 mL/min/1.73m2    GFR MDRD Non Af Amer >60 >60 mL/min/1.73m2    Narrative    Fasting Glucose reference range is 70-99 mg/dL per  American Diabetes Association (ADA) guidelines.       Hi Carl:  Normal labs    Please call with questions or contact us using [employer].    Sincerely,        Electronically signed by Honorio Knowles MD

## 2021-06-21 NOTE — LETTER
Letter by Honorio Knowles MD at      Author: Honorio Knowles MD Service: -- Author Type: --    Filed:  Encounter Date: 11/15/2020 Status: (Other)         Carl Iverson  [address] [facility] [address]             November 15, 2020         Dear Mr. Iverson,    Below are the results from your recent visit:    Resulted Orders   COVID-19 Virus PCR MRF   Result Value Ref Range    COVID-19 VIRUS SPECIMEN SOURCE Nasopharyngeal     2019-nCOV Not Detected       Comment:      Collection of multiple specimens from the same patient may be necessary to  detect the virus. The possibility of a false negative should be considered if  the patient's recent exposure or clinical presentation suggests 2019 nCOV  infection and diagnostic tests for other causes of illness are negative. Repeat  testing may be considered in this setting.  Patient sample was heat inactivated and amplified using the HDPCR SARS-CoV-2  assay ([employer] The HDPCRTM SARS-CoV-2 assay is a reverse  transcription real-time polymerase chain reaction (qRT-PCR) test intended for  the qualitative detection of nucleic acid  from SARS-CoV-2 in human nasopharyngeal swabs, oropharyngeal swabs, anterior  nasal swabs, mid-turbinate nasal swabs as well as nasal aspirate, nasal wash,  and bronchoalveolar lavage (BAL) specimens from individuals who are suspected  of COVID-19 by their healthcare provider.  A negative result does not rule out the presence of real-time PCR inhibitors in  the specimen or  COVID-19 RNA in concentrations below the limit of detection of  the assay. The possibility of a false negative should be considered if the  patients recent exposure or clinical presentation suggests COVID-19. Additional  testing or repeat testing requires consultation with the laboratory.  Nasopharyngeal specimen is the preferred choice for swab-based SARS CoV2  testing. When collection of a nasopharyngeal swab is not possible the following  are  acceptable alternatives:  an oropharyngeal (OP) specimen collected by a healthcare professional, or a  nasal mid-turbinate (NMT) swab collected by a healthcare professional or by  onsite self-collection (using a flocked tapered swab), or an anterior nares  specimen collected by a healthcare professional or by onsite self-collection  (using a round foam swab). (Centers for Disease Control)  Testing performed by [facility] ([facility]) [address] [place] [id number]  The  test performance characteristics were determined by [facility]. It has not been  cleared or approved by the FDA.  The laboratory is regulated under the Clinical Laboratory Improvement  Amendments of 1988 (CLIA-88) as qualified to perform high-complexity testing.  This test is used for clinical purposes. It should not be regarded as  investigational or for research.    Performed and/or entered by:  [facility]  [address] [place] [address]        Mohan Henry:  Your recent screen for Covid-19 was NEGATIVE.    Please call with questions or contact us using [facility].    Sincerely,        Electronically signed by Honorio Knowles MD

## 2021-06-21 NOTE — LETTER
Letter by Honorio Knowles MD at      Author: Honorio Knowles MD Service: -- Author Type: --    Filed:  Encounter Date: 11/17/2020 Status: (Other)         Carl Iverson  [address] [facility] [address]             November 17, 2020         Dear Mr. Iverson,    Below are the results from your recent visit:    Resulted Orders   Comprehensive Metabolic Panel   Result Value Ref Range    Sodium 141 136 - 145 mmol/L    Potassium 4.4 3.5 - 5.0 mmol/L    Chloride 103 98 - 107 mmol/L    CO2 29 22 - 31 mmol/L    Anion Gap, Calculation 9 5 - 18 mmol/L    Glucose 103 70 - 125 mg/dL    BUN 17 8 - 22 mg/dL    Creatinine 0.91 0.70 - 1.30 mg/dL    GFR MDRD Af Amer >60 >60 mL/min/1.73m2    GFR MDRD Non Af Amer >60 >60 mL/min/1.73m2    Bilirubin, Total 0.9 0.0 - 1.0 mg/dL    Calcium 9.9 8.5 - 10.5 mg/dL    Protein, Total 6.5 6.0 - 8.0 g/dL    Albumin 4.2 3.5 - 5.0 g/dL    Alkaline Phosphatase 58 45 - 120 U/L    AST 20 0 - 40 U/L    ALT 28 0 - 45 U/L    Narrative    Fasting Glucose reference range is 70-99 mg/dL per  American Diabetes Association (ADA) guidelines.   Lipid Profile   Result Value Ref Range    Triglycerides 91 <=149 mg/dL    Cholesterol 155 <=199 mg/dL    LDL Calculated 84 <=129 mg/dL    HDL Cholesterol 53 >=40 mg/dL    Patient Fasting > 8hrs? Yes    PSA, Annual Screen (Prostatic-Specific Antigen)   Result Value Ref Range    PSA 0.7 0.0 - 4.5 ng/mL    Narrative    Method is Abbott Prostate-Specific Antigen (PSA)  Standard-WHO 1st International (90:10)       Mohan Harris:  Your PSA is normal and stable.  Recheck the PSA in one year.  Your blood sugar is upper normal.  Work on a weight loss of 20 lbs through a low carb diet combined with exercise..\the cholesterol panel is excellent.  The remaining labs are normal.  It was a pleasure seeing you in clinic today.  Continue to take measures to avoid Covid.    Please call with questions or contact us using [employer].    Sincerely,        Electronically signed by  Honorio Knowles MD

## 2021-06-22 NOTE — PROGRESS NOTES
ASSESSMENT & PLAN:  1. Routine general medical examination at a health care facility  Labs were drawn earlier today including fasting glucose and PSA, will add hep C screening based on age.  Vitamin D level today as it has been routinely monitored in the past and he does take supplements.  Since his last check, over the last couple months, he has been taking 5000 units vitamin D plus his fish oil that contains D and multivitamin that contained E.  He was previously taking 10,000 units of D.  Immunizations up-to-date other than he should consider shingrix, check with insurance for coverage.    2. Hyperlipidemia, unspecified hyperlipidemia type  Fasting lipids were drawn earlier today as well as CMP.  Results pending.  I will also add a CK due to his muscle pain.  He states that symptoms are not bothersome enough to make him want to change or hold medication.  Follow-up if worsening or more bothersome.    3. Hypertension  Blood pressure is a little elevated today but he reports home blood pressure readings 120s over 80s.  Continue current medications and continue monitoring.  He also is working on increasing exercise and weight loss.    4. 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  Patient does not recall needing this.  I have sent a message to Dr. Heart and notified patient in writing that he should contact me if he does not hear a response from me within a week.      Patient Instructions   Continue to check your BP regularly and let us know if >140/90.    Check to see if insurance covers shingrix (shingles vaccine).    I will send Dr. Heart a note to see if you need endoscopy and how often. If you don't hear back from me in a week, please send me a message.      No orders of the defined types were placed in this encounter.    There are no discontinued medications.    Return in about 6 months (around 6/17/2019)  for Recheck HTN routine follow up.    CHIEF COMPLAINT:  Chief Complaint   Patient presents with     Annual Exam     Pt is getting over a cold, no questions or concerns. Lab work was done this AM       HISTORY OF PRESENT ILLNESS:  Carl is a 58 y.o. male presenting to the clinic today for follow-up of hypertension, hyperlipidemia, and annual physical.  No concerns today.  Getting over a cold with cough and congestion, but feeling improved, and denies any fever or shortness of breath.  Patient reports compliance with his Crestor for hyperlipidemia.  Due to family history of coronary artery disease as well as personal history of coronary artery calcifications seen on imaging, patient has goal LDL less than 70 so has been on Crestor.  Was previously on a different statin that was not as effective.  In the last several months or year has noted achy pains in his legs bilaterally, wondering if related to medication.  He notices it in his thighs or in the groin creases especially after being seated for a long time and getting up.  He reports compliance with his antihypertensive regimen.  Checks his blood pressure regularly at home and reports values in the 120s over 80s.  Regarding health maintenance, tetanus is up-to-date.  He states he got a flu shot.  Colonoscopy up-to-date 2016, polyp, repeat 5 years.    Patient has a history of GIST which presented with GI bleed in 2010.  Was resected.  Followed with oncology until 2017, at which point they turned him over to follow with PCP.  Dr. Heart's last note in 2017 does mention that patient would be due for an endoscopy.  Patient not recall this.  He has not had an endoscopy last couple of years.    REVIEW OF SYSTEMS:   All other systems are negative.  With the exception of this past week because of his cold, he has been exercising regularly doing cardiovascular exercise and denies any chest pain or shortness of breath.  Denies any prostate related symptoms such as weak  "stream, difficulty initiating stream, difficulty emptying, dribbling.    PFSH:  Reviewed in E HR    TOBACCO USE:  Social History     Tobacco Use   Smoking Status Never Smoker   Smokeless Tobacco Never Used       VITALS:  Vitals:    12/17/18 1250   BP: 120/89   Patient Site: Left Arm   Patient Position: Sitting   Cuff Size: Adult Regular   Pulse: 72   Resp: 20   Temp: 98  F (36.7  C)   TempSrc: Oral   SpO2: 96%   Weight: (!) 263 lb 12.8 oz (119.7 kg)   Height: 5' 10\" (1.778 m)     Wt Readings from Last 3 Encounters:   12/17/18 (!) 263 lb 12.8 oz (119.7 kg)   10/05/18 (!) 265 lb (120.2 kg)   09/05/18 (!) 264 lb (119.7 kg)     Body mass index is 37.85 kg/m .    PHYSICAL EXAM:  GENERAL:  Pleasant, well-appearing man in no acute distress.  VITAL SIGNS:  Reviewed.  HEENT:  Pupils are equal, round, and reactive to light.  Sclerae and  conjunctivae clear.  TMs are clear bilaterally.  Oropharynx is clear with  moist mucous membranes.  NECK:  Supple without lymphadenopathy or thyromegaly.  No carotid bruits.  CARDIOVASCULAR:  Heart regular rate and rhythm without murmur.  Normal S1 and  S2.  LUNGS:  Clear to auscultation bilaterally without wheezes or crackles.  Good  air movement throughout.    ABDOMEN:  Soft, nontender, and nondistended without  guarding or rebound.  No organomegaly.  EXTREMITIES:  Warm and well perfused without edema. Dorsalis pedis pulses easily palpable and symmetric bilaterally.  NEURO: Alert and oriented. Grossly nonfocal.  PSYCHIATRIC: Presents on time and well groomed.  Normal speech and thought content.  Full affect.  No abnormal movements or behaviors noted.   GENITAL/RECTAL: Normal genitalia.  No skin lesions.  Digital rectal exam reveals a normal prostate, smooth, no nodules, no significant enlargement       QUALITY MEASURES:  The following high BMI interventions were performed this visit: encouragement to exercise      MEDICATIONS:  Current Outpatient Medications   Medication Sig Dispense Refill "     aspirin 81 MG EC tablet Take 81 mg by mouth daily.       cholecalciferol, vitamin D3, 5,000 unit Tab Take 2 tablets by mouth daily.       DOCOSAHEXANOIC ACID/EPA (FISH OIL ORAL) Take 1,200 mg by mouth daily.       lisinopril-hydrochlorothiazide (PRINZIDE,ZESTORETIC) 20-25 mg per tablet Take 1 tablet by mouth daily. 90 tablet 3     multivit-minerals/ferrous fum (MULTI VITAMIN ORAL) Take by mouth daily.       rosuvastatin (CRESTOR) 20 MG tablet Take 1 tablet (20 mg total) by mouth every morning. 90 tablet 3     No current facility-administered medications for this visit.

## 2021-06-23 NOTE — TELEPHONE ENCOUNTER
Refill Approved    Rx renewed per Medication Renewal Policy. Medication was last renewed on 12 11 17    [facility] Triage/Med Refill 1/25/2019     Requested Prescriptions   Signed Prescriptions Disp Refills     rosuvastatin (CRESTOR) 20 MG tablet 90 tablet 3     Sig: Take 1 tablet (20 mg total) by mouth every morning.    Statins Refill Protocol (Hmg CoA Reductase Inhibitors) Passed - 1/25/2019  3:29 PM       Passed - PCP or prescribing provider visit in past 12 months     Last office visit with prescriber/PCP: 7/13/2018 Honorio Knowles MD OR same dept: 10/5/2018 Trent Venegas MD OR same specialty: 10/5/2018 Trent Venegas MD  Last physical: Visit date not found Last MTM visit: Visit date not found   Next visit within 3 mo: Visit date not found  Next physical within 3 mo: Visit date not found  Prescriber OR PCP: Honorio Knowles MD  Last diagnosis associated with med order: 1. Hyperlipidemia  - rosuvastatin (CRESTOR) 20 MG tablet; Take 1 tablet (20 mg total) by mouth every morning.  Dispense: 90 tablet; Refill: 3    If protocol passes may refill for 12 months if within 3 months of last provider visit (or a total of 15 months).                   Lab Results   Component Value Date    CHOL 138 12/17/2018    CHOL 142 07/13/2018    CHOL 158 12/11/2017     Lab Results   Component Value Date    HDL 42 12/17/2018    HDL 51 07/13/2018    HDL 49 12/11/2017     Lab Results   Component Value Date    LDLCALC 81 12/17/2018    LDLCALC 68 07/13/2018    LDLCALC 93 12/11/2017     Lab Results   Component Value Date    TRIG 76 12/17/2018    TRIG 117 07/13/2018    TRIG 81 12/11/2017     No components found for: CHOLHDL

## 2021-06-24 NOTE — TELEPHONE ENCOUNTER
Who is calling:   patient  Reason for Call:  Patient only has 2 days left  Please expedite  Date of last appointment with primary care:  12/17/2018  Has the patient been recently seen:  Yes  Okay to leave a detailed message: Yes

## 2021-06-24 NOTE — TELEPHONE ENCOUNTER
Refill Approved    Rx renewed per Medication Renewal Policy. Medication was last renewed on 12/11/17.    Valarie Fan, [facility] Triage/Med Refill 2/18/2019     Requested Prescriptions   Pending Prescriptions Disp Refills     lisinopril-hydrochlorothiazide (PRINZIDE,ZESTORETIC) 20-25 mg per tablet [Pharmacy Med Name: LISINOPRIL/HCTZ 20-25MG TAB] 90 tablet 3     Sig: TAKE ONE TABLET BY MOUTH ONCE DAILY    Diuretics/Combination Diuretics Refill Protocol  Passed - 2/18/2019  2:11 PM       Passed - Visit with PCP or prescribing provider visit in past 12 months    Last office visit with prescriber/PCP: Visit date not found OR same dept: 10/5/2018 Trent Venegas MD OR same specialty: 10/5/2018 Trent Venegas MD  Last physical: 12/11/2017 Last MTM visit: Visit date not found   Next visit within 3 mo: Visit date not found  Next physical within 3 mo: Visit date not found  Prescriber OR PCP: Renee Merino CNP  Last diagnosis associated with med order: 1. HTN (hypertension)  - lisinopril-hydrochlorothiazide (PRINZIDE,ZESTORETIC) 20-25 mg per tablet [Pharmacy Med Name: LISINOPRIL/HCTZ 20-25MG TAB]; TAKE ONE TABLET BY MOUTH ONCE DAILY  Dispense: 90 tablet; Refill: 3    If protocol passes may refill for 12 months if within 3 months of last provider visit (or a total of 15 months).            Passed - Serum Potassium in past 12 months     Lab Results   Component Value Date    Potassium 4.3 12/17/2018            Passed - Serum Sodium in past 12 months     Lab Results   Component Value Date    Sodium 142 12/17/2018            Passed - Blood pressure on file in past 12 months    BP Readings from Last 1 Encounters:   12/17/18 120/89            Passed - Serum Creatinine in past 12 months     Creatinine   Date Value Ref Range Status   12/17/2018 0.86 0.70 - 1.30 mg/dL Final

## 2021-06-27 NOTE — PROGRESS NOTES
Progress Notes by Honorio Knowles MD at 5/30/2019  9:50 AM     Author: Honorio Knowles MD Service: -- Author Type: Physician    Filed: 6/7/2019 12:59 PM Encounter Date: 5/30/2019 Status: Addendum    : Honorio Knowles MD (Physician)    Related Notes: Original Note by Honorio Knowles MD (Physician) filed at 6/3/2019 11:01 PM       Preoperative Exam    Scheduled Procedure: Right Knee Arthroscopy  Surgery Date:  06/21/19  Surgery Location: [facility], fax [phone]    Surgeon:  Dr. Astudillo    Assessment/Plan:     Encounter Diagnoses   Name Primary?   ? Pre-operative examination Yes   ? Tear of medial meniscus of right knee, current, unspecified tear type, subsequent encounter    ? Hypertension    ? Coronary artery disease involving native heart without angina pectoris, unspecified vessel or lesion type         HPI:  Right knee pain for about 10 weeks,  Getting less painful, the pain is a dull pain about 1/10, when walking about the same.  Achy pain.       Surgical Procedure Risk: Low (reported cardiac risk generally < 1%)  Have you had prior anesthesia?: Yes  Have you or any family members had a previous anesthesia reaction:  Yes: MOTHER HAS A H/O NAUSEA WITH ANEESTHESIA  Do you or any family members have a history of a clotting or bleeding disorder?: No  Cardiac Risk Assessment: increased risk for major cardiac complications based on  MILD TO MODERATE ASCAD ON CT    PLAN:     PRE-OP LABS    SCHEDULE PRE- OP  NUCLEAR STRESS TEST.  Proceed with surgery if stress test is normal.     EKG personally  READ AND REVIEWED WITH PT    Functional Status: Independent  Patient plans to recover at home with family.     Subjective:      Carl Iverson is a 59 y.o. male who presents for a preoperative consultation.      All other systems reviewed and are negative, other than those listed in the HPI.    Pertinent History  Do you have difficulty breathing or  chest pain after walking up a flight of stairs: No  History of obstructive sleep apnea: No  Steroid use in the last 6 months: No  Frequent Aspirin/NSAID use: TAKES BABY ASPIRIN DAILY  Prior Blood Transfusion: No  Prior Blood Transfusion Reaction: No  If for some reason prior to, during or after the procedure, if it is medically indicated, would you be willing to have a blood transfusion?:  There is no transfusion refusal.    Current Outpatient Medications   Medication Sig Dispense Refill   ? aspirin 81 MG EC tablet Take 81 mg by mouth daily.     ? cholecalciferol, vitamin D3, 5,000 unit Tab Take 1 tablet by mouth daily.            ? DOCOSAHEXANOIC ACID/EPA (FISH OIL ORAL) Take 1,200 mg by mouth daily.     ? glucosamine sulfate (GLUCOSAMINE ORAL) Take 1,200 mg by mouth daily.     ? lisinopril-hydrochlorothiazide (PRINZIDE,ZESTORETIC) 20-25 mg per tablet TAKE ONE TABLET BY MOUTH ONCE DAILY 90 tablet 2   ? multivit-minerals/ferrous fum (MULTI VITAMIN ORAL) Take by mouth daily.     ? rosuvastatin (CRESTOR) 20 MG tablet Take 1 tablet (20 mg total) by mouth every morning. 90 tablet 3     No current facility-administered medications for this visit.         No Known Allergies    Patient Active Problem List   Diagnosis   ? Hyperlipidemia   ? Obesity   ? Hypertension   ? GIST of duodenum   ? Back Pain   ? Adenomatous colon polyp   ? Foot mass, right   ? CAD (coronary artery disease)       Past Medical History:   Diagnosis Date   ? Cancer (H)     Skin & Abdominal   ? Hyperlipidemia    ? Hyperlipidemia    ? Hypertension        Past Surgical History:   Procedure Laterality Date   ? ABDOMINAL SURGERY  2010    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   ? CATARACT EXTRACTION Right    ? EYE SURGERY     ? GANGLION CYST EXCISION Right 10/7/2016    Procedure: RIGHT FOOT MASS RESECTION;  Surgeon: Meet Dickson DPM;  [place] [facility] [place]  Service:    ? TUMOR REMOVAL      from neck non-cancerous)       Social History  "    Socioeconomic History   ? Marital status:      Spouse name: Not on file   ? Number of children: Not on file   ? Years of education: Not on file   ? Highest education level: Not on file   Occupational History   ? Not on file   Social Needs   ? Financial resource strain: Not on file   ? Food insecurity:     Worry: Not on file     Inability: Not on file   ? Transportation needs:     Medical: Not on file     Non-medical: Not on file   Tobacco Use   ? Smoking status: Never Smoker   ? Smokeless tobacco: Never Used   Substance and Sexual Activity   ? Alcohol use: Yes     Alcohol/week: 6.0 oz     Types: 5 Standard drinks or equivalent, 5 Shots of liquor per week   ? Drug use: No   ? Sexual activity: Never   Lifestyle   ? Physical activity:     Days per week: Not on file     Minutes per session: Not on file   ? Stress: Not on file   Relationships   ? Social connections:     Talks on phone: Not on file     Gets together: Not on file     Attends Muslim service: Not on file     Active member of club or organization: Not on file     Attends meetings of clubs or organizations: Not on file     Relationship status: Not on file   ? Intimate partner violence:     Fear of current or ex partner: Not on file     Emotionally abused: Not on file     Physically abused: Not on file     Forced sexual activity: Not on file   Other Topics Concern   ? Not on file   Social History Narrative   ? Not on file       Patient Care Team:  Honorio Knowles MD as PCP - General (Family Medicine)  Aaron Heart MD as Physician (Hematology and Oncology)          Objective:     Vitals:    05/30/19 0946 05/30/19 1014   BP: (!) 137/94 125/86   Pulse: 63    Resp: 20    Temp: 97.1  F (36.2  C)    TempSrc: Oral    Weight: (!) 272 lb 3.2 oz (123.5 kg)    Height: 5' 10\" (1.778 m)          Physical Exam:    GEN:  ALERT, ORIENTED TIMES THREE, NO APPARENT DISTRESS  HEENT:  TM'S NL                  PERRL                  THROAT CLEAR  NECK: " SUPPLE WITHOUT ADENOPATHY, THYROMEGALY OR CAROTID BRUIT  LUNGS:  CTA  COR:  RRR WITHOUT MURMUR  ABDOMEN: SOFT, POSITIVE BOWEL SOUNDS, NONTX WITHOUT MASS  EXT: NO CYANOSIS, CLUBBING OR EDEMA  SKIN:  NO ATYPICAL APPEARING SKIN LESIONS        Patient Instructions   PRE-OP LABS    SCHEDULE PRE- OP  NUCLEAR STRESS TEST.      Labs:  Labs pending at this time.  Results will be reviewed when available.    Immunization History   Administered Date(s) Administered   ? DT (pediatric) 1994   ? INFLUENZA,RECOMBINANT,INJ,PF QUADRIVALENT 18+YRS 10/05/2018   ? Influenza, inj, historic,unspecified 2007   ? Influenza,seasonal, Inj IIV3 2007   ? Td, Adult, Absorbed 2004, 2007   ? Td,adult,historic,unspecified 2004, 2007   ? Tdap 2010     Electrocardiogram Perform and Read   Order: [id number]   Status:  In process   Visible to patient:  No (Not Released) Next appt:  None Dx:  Pre-operative examination; Tear of me...     Ref Range & Units 19 1032 10/3/16 0911    SYSTOLIC BLOOD PRESSURE mmHg      DIASTOLIC BLOOD PRESSURE mmHg      VENTRICULAR RATE BPM 55  59     ATRIAL RATE BPM 55  59     P-R INTERVAL ms 204  192     QRS DURATION ms 94  98     Q-T INTERVAL ms 448  432     QTC CALCULATION (BEZET) ms 428  427     P [place] degrees 31  32     R AXIS degrees 12  18     T AXIS degrees 20  21     MUSE DIAGNOSIS  Sinus bradycardia   Otherwise normal ECG   When compared with ECG of 03-OCT-2016 09:11,   No significant change was found     Sinus bradycardia   Otherwise normal ECG   No previous ECGs available   Confirmed by ASIA GOTTLIEB MD LOC: ([id number]) on 10/3/2016 2:05:52 PM               ADDENDUM:  NM Exercise Stress Test   Order# [id number]   Reading physician: Jonathan Ayala DO Ordering physician: Honorio Knowles MD Study date: 6/3/19   Patient Information     Patient Name  Carl Iverson MRN  [id number] Sex  Male              Age  1960 (59 y.o.)   EKG Scan        Scan on: 6/3/19 8:52 AM by:    Indications     Pre-operative examination   Tear of medial meniscus of right knee, current, unspecified tear type, subsequent encounter   Coronary artery disease involving native heart without angina pectoris, unspecified vessel or lesion type   Conclusion       The exercise nuclear stress test is negative for inducible myocardial ischemia or infarction.    The patient's exercise capacity is normal.    The left ventricular ejection fraction is 70%.    There is no prior study available.        PROCEED WITH SCHEDULED SURGERY AS STRESS TEST IS NORMAL      Electronically signed by Honorio Knowles MD 05/30/19 9:48 AM

## 2021-06-30 NOTE — PROGRESS NOTES
Progress Notes by Honorio Knowles MD at 11/17/2020  9:10 AM     Author: Honorio Knowles MD Service: -- Author Type: Physician    Filed: 11/17/2020  9:53 AM Encounter Date: 11/17/2020 Status: Signed    : Honorio Knowles MD (Physician)       MALE PREVENTATIVE EXAM    Assessment and Plan:       1. Health care maintenance      2. Hypertension  CONTROLLED  - Comprehensive Metabolic Panel    3. Class 2 obesity due to excess calories without serious comorbidity in adult, unspecified BMI  WORK ON WEIGHT GOAL  LBS    4. Adenomatous polyp of colon, unspecified part of colon  COLONOSCOPY IN NOV 2021    5. Coronary artery disease involving native heart without angina pectoris, unspecified vessel or lesion type  SEEN ON CT, PT ,  ASX,  PT ON STATIN  - Lipid Profile    6. Need for tetanus booster    - Td, Preservative Free (green label)    7. Screening PSA (prostate specific antigen)    - PSA, Annual Screen (Prostatic-Specific Antigen)    PLAN:   Colonoscopy due in Nov 2021    Fasting labs    Tetanus booster    Work on weight goal of 240 lbs  The following high BMI interventions were performed this visit: encouragement to exercise and dietary management education, guidance, and counseling           Next follow up:  No follow-ups on file.    Immunization Review  Adult Imm Review: Due today, orders placed  BMI: 36  cigar occas; alcohol social    I discussed the following with the patient:   Adult Healthy Living: Importance of regular exercise  Healthy nutrition    I have had an Advance Directives discussion with the patient.    Subjective:   Chief Complaint: Carl Iverson is an 60 y.o. male here for a preventative health visit.     HPI:  na    Healthy Habits  Are you taking a daily aspirin? Yes  Do you typically exercising at least 40 min, 3-4 times per week?  Yes  Do you usually eat at least 4 servings of fruit and vegetables a day, include whole grains and fiber and avoid regularly eating high  "fat foods? Yes  Have you had an eye exam in the past two years? NO  Do you see a dentist twice per year? Yes  Do you have any concerns regarding sleep? YES    Safety Screen  If you own firearms, are they secured in a locked gun cabinet or with trigger locks? Yes  Do you feel you are safe where you are living?: Yes (11/17/2020  9:02 AM)  Do you feel you are safe in your relationship(s)?: Yes (11/17/2020  9:02 AM)      Review of Systems:  Legs sometimes ache      Please see above.  The rest of the review of systems are negative for all systems.     Cancer Screening     Patient has no health maintenance due at this time          Patient Care Team:  Honorio Knowles MD as PCP - General (Family Medicine)  Aaron Heart MD as Physician (Hematology and Oncology)  Honorio Knowles MD as Assigned PCP        History     Reviewed By Date/Time Sections Reviewed    Christina Ramsay LPN 11/17/2020  9:02 AM Tobacco    Christina Ramsay LPN 11/17/2020  9:00 AM Tobacco    Christina Ramsay LPN 11/17/2020  8:59 AM Tobacco            Objective:   Vital Signs:   Visit Vitals  /83   Pulse 66   Temp 97.8  F (36.6  C) (Oral)   Resp 20   Ht 5' 10\" (1.778 m)   Wt (!) 253 lb 6.4 oz (114.9 kg)   BMI 36.36 kg/m       Wt Readings from Last 3 Encounters:   11/17/20 (!) 253 lb 6.4 oz (114.9 kg)   10/09/20 (!) 253 lb 3.2 oz (114.9 kg)   12/18/19 (!) 264 lb 2 oz (119.8 kg)        PHYSICAL EXAM  /83   Pulse 66   Temp 97.8  F (36.6  C) (Oral)   Resp 20   Ht 5' 10\" (1.778 m)   Wt (!) 253 lb 6.4 oz (114.9 kg)   BMI 36.36 kg/m      General Appearance:    Alert, cooperative, no distress, appears stated age   Head:    Normocephalic, without obvious abnormality, atraumatic   Eyes:    PERRL, conjunctiva/corneas clear, EOM's intact, fundi     benign, both eyes        Ears:    Normal TM's and external ear canals, both ears   Nose:   Nares normal, septum midline, mucosa normal, no drainage    or sinus tenderness   Throat:   " Lips, mucosa, and tongue normal; teeth and gums normal   Neck:   Supple, symmetrical, trachea midline, no adenopathy;        thyroid:  No enlargement/tenderness/nodules; no carotid    bruit or JVD   Back:     Symmetric, no curvature, ROM normal, no CVA tenderness   Lungs:     Clear to auscultation bilaterally, respirations unlabored   Chest wall:    No tenderness or deformity   Heart:    Regular rate and rhythm, S1 and S2 normal, no murmur, rub    or gallop   Abdomen:     Soft, non-tender, bowel sounds active all four quadrants,     no masses, no organomegaly   Genitalia:    No exam   Rectal:    Declined after discussion;  Reconsider next year.   Extremities:   Extremities normal, atraumatic, no cyanosis or edema       Skin:   Skin color, texture, turgor normal, no rashes or lesions   Lymph nodes:   Cervical, supraclavicular, and axillary nodes normal   Neurologic:   CNII-XII intact. Normal strength, sensation and reflexes       throughout         The 10-year ASCVD risk score (Jaebassem REDMAN Jr., et al., 2013) is: 6.5%    Values used to calculate the score:      Age: 60 years      Sex: Male      Is Non- : No      Diabetic: No      Tobacco smoker: No      Systolic Blood Pressure: 114 mmHg      Is BP treated: Yes      HDL Cholesterol: 50 mg/dL      Total Cholesterol: 153 mg/dL    Lab Results   Component Value Date    CHOL 153 12/18/2019    CHOL 138 12/17/2018    CHOL 142 07/13/2018     Lab Results   Component Value Date    HDL 50 12/18/2019    HDL 42 12/17/2018    HDL 51 07/13/2018     Lab Results   Component Value Date    LDLCALC 84 12/18/2019    LDLCALC 81 12/17/2018    LDLCALC 68 07/13/2018     Lab Results   Component Value Date    TRIG 97 12/18/2019    TRIG 76 12/17/2018    TRIG 117 07/13/2018     Results for orders placed or performed in visit on 12/18/19   Comprehensive Metabolic Panel   Result Value Ref Range    Sodium 139 136 - 145 mmol/L    Potassium 4.4 3.5 - 5.0 mmol/L    Chloride 100 98 -  107 mmol/L    CO2 28 22 - 31 mmol/L    Anion Gap, Calculation 11 5 - 18 mmol/L    Glucose 93 70 - 125 mg/dL    BUN 15 8 - 22 mg/dL    Creatinine 0.91 0.70 - 1.30 mg/dL    GFR MDRD Af Amer >60 >60 mL/min/1.73m2    GFR MDRD Non Af Amer >60 >60 mL/min/1.73m2    Bilirubin, Total 1.2 (H) 0.0 - 1.0 mg/dL    Calcium 10.0 8.5 - 10.5 mg/dL    Protein, Total 7.0 6.0 - 8.0 g/dL    Albumin 4.3 3.5 - 5.0 g/dL    Alkaline Phosphatase 67 45 - 120 U/L    AST 22 0 - 40 U/L    ALT 28 0 - 45 U/L     Lab Results   Component Value Date    PSA 1.5 12/18/2019    PSA 0.5 12/17/2018    PSA 0.7 12/11/2017       No components found for: CHOLHDL       Medication List          Accurate as of November 17, 2020  9:29 AM. If you have any questions, ask your nurse or doctor.            CONTINUE taking these medications    aspirin 81 MG EC tablet  INSTRUCTIONS: Take 81 mg by mouth daily.        cholecalciferol (vitamin D3) 5,000 unit Tab  INSTRUCTIONS: Take 1 tablet by mouth daily.         Co Q-10 100 mg capsule  INSTRUCTIONS: Take 300 mg by mouth daily.  Generic drug: coenzyme Q10        FISH OIL ORAL  INSTRUCTIONS: Take 1,200 mg by mouth daily.        lisinopriL-hydrochlorothiazide 20-25 mg per tablet  Also known as: PRINZIDE,ZESTORETIC  INSTRUCTIONS: Take 2 tablets by mouth daily.        MULTI VITAMIN ORAL  INSTRUCTIONS: Take by mouth daily.        rosuvastatin 20 MG tablet  Also known as: CRESTOR  INSTRUCTIONS: TAKE 1 TABLET BY MOUTH EVERY MORNING               Additional Screenings Completed Today:

## 2021-07-03 NOTE — ADDENDUM NOTE
Addendum Note by Ky Johnson CMA at 12/17/2018  1:00 PM     Author: Ky Johnson CMA Service: -- Author Type: Certified Medical Assistant    Filed: 12/18/2018  1:37 PM Encounter Date: 12/17/2018 Status: Signed    : Ky Johnson CMA (Certified Medical Assistant)    Addended by: KY JOHNSON on: 12/18/2018 01:37 PM        Modules accepted: Orders

## 2021-08-10 ENCOUNTER — OFFICE VISIT (OUTPATIENT)
Dept: FAMILY MEDICINE | Facility: CLINIC | Age: 61
End: 2021-08-10
Payer: COMMERCIAL

## 2021-08-10 VITALS
OXYGEN SATURATION: 97 % | HEART RATE: 71 BPM | WEIGHT: 257.6 LBS | DIASTOLIC BLOOD PRESSURE: 85 MMHG | RESPIRATION RATE: 16 BRPM | SYSTOLIC BLOOD PRESSURE: 121 MMHG | TEMPERATURE: 97.9 F | BODY MASS INDEX: 36.96 KG/M2

## 2021-08-10 DIAGNOSIS — R23.8 PAPULE: Primary | ICD-10-CM

## 2021-08-10 PROCEDURE — 99213 OFFICE O/P EST LOW 20 MIN: CPT | Performed by: FAMILY MEDICINE

## 2021-08-10 NOTE — PROGRESS NOTES
DIAGNOSIS:  Encounter Diagnosis   Name Primary?     Papule, on the hard palate Yes        PLAN:  To see ENT (Dr Blake) in consultation          HPI:  On the roof of his mouth he has a growth that he has felt there for 1- 11/2 mo  Seems to go down at times.  No pain.  Sometimes worse if he eats hot or spicy food.           Current Outpatient Medications   Medication     aspirin 81 MG EC tablet     cholecalciferol, vitamin D3, 5,000 unit Tab     coenzyme Q10 (CO Q-10) 100 mg capsule     DOCOSAHEXANOIC ACID/EPA (FISH OIL ORAL)     lisinopriL-hydrochlorothiazide (PRINZIDE,ZESTORETIC) 20-25 mg per tablet     multivit-minerals/ferrous fum (MULTI VITAMIN ORAL)     rosuvastatin (CRESTOR) 20 MG tablet     No current facility-administered medications for this visit.       Pmh: reviewed  Psh: reviewed  Allergy:  reviewed      EXAM:    /85   Pulse 71   Temp 97.9  F (36.6  C) (Oral)   Resp 16   Wt 116.8 kg (257 lb 9.6 oz)   SpO2 97%   BMI 36.96 kg/m    GEN:   ALERT, NAD, ORIENTED TIMES THREE  ENT: 2-3 mm flesh colored papule on the hard palate in the midline.  EXT:    WITHOUT EDEMA/SWELLING

## 2021-09-10 ENCOUNTER — OFFICE VISIT (OUTPATIENT)
Dept: OTOLARYNGOLOGY | Facility: CLINIC | Age: 61
End: 2021-09-10
Attending: FAMILY MEDICINE
Payer: COMMERCIAL

## 2021-09-10 DIAGNOSIS — K13.79 LESION OF HARD PALATE: Primary | ICD-10-CM

## 2021-09-10 DIAGNOSIS — R23.8 PAPULE: ICD-10-CM

## 2021-09-10 PROCEDURE — 99203 OFFICE O/P NEW LOW 30 MIN: CPT | Mod: 25 | Performed by: OTOLARYNGOLOGY

## 2021-09-10 PROCEDURE — 40808 BIOPSY OF MOUTH LESION: CPT | Performed by: OTOLARYNGOLOGY

## 2021-09-10 PROCEDURE — 88305 TISSUE EXAM BY PATHOLOGIST: CPT | Performed by: PATHOLOGY

## 2021-09-10 NOTE — LETTER
9/10/2021         RE: Carl Iverson  [id number] [facility] [id number]        Dear Colleague,    Thank you for referring your patient, Carl Iverson, to the [facility]. Please see a copy of my visit note below.    surgCHIEF COMPLAINT:   Diagnoses       Codes Comments    Papule     R23.8              HISTORY OF PRESENT ILLNESS    Carl was seen at the behest of Honorio Knowles MD for lesion of his palate.  It is not painful.  Has been present for 1 month.  Not bleeding.  No recent oral trauma. He noticed it because he can feel it with his tongue.   Diagnoses       Codes Comments    Papule     R23.8                REVIEW OF SYSTEMS    Review of Systems as per HPI and PMHx, otherwise 10 system review system are negative.     Patient has no known allergies.     There were no vitals taken for this visit.    HEAD: Normal appearance and symmetry:  No cutaneous lesions.      NECK:  supple     EARS: normal TM, EACs     NOSE:     Dorsum:   straight  Septum:  midline  Mucosa:  moist  Inferior turbinates:  wn        ORAL CAVITY/OROPHARYNX:     Lips:  Normal.  Tongue: normal, midline  Mucosa:   no lesions  Palate: small, 3-4 mm raised area toward midline with central ulceration; non-tender, non-erythematous  Tonsils:  1+     NECK:  Trachea:  midline.              Thyroid:  normal              Adenopathy:  none        NEURO:   Alert and Oriented     GAIT AND STATION:  normal     RESPIRATORY:   Symmetry and Respiratory effort     PSYCH:  Normal mood and affect     SKIN:   warm and dry     Procedure note biopsy of hard palate        After obtaining written consent patient taken the minor procedure room topical benzocaine is placed on the surface of the hard palate.  Is and then injected with 2 cc 1% lidocaine with epinephrine 1/2000.  A 3 mm punch was then used to biopsy the lesion and it is transected with with scissors and passed off the field.  The base is cauterized with silver nitrate with good  effect    IMPRESSION:    Encounter Diagnoses   Name Primary?     Papule      Lesion of hard palate Yes          RECOMMENDATIONS:    Patient is cautioned to avoid hot liquids for the next several hours.  Soft diet for the next 3 days.  Salt water gargles 2-3 times daily for the next several days          Again, thank you for allowing me to participate in the care of your patient.        Sincerely,        Alex Blake MD

## 2021-09-10 NOTE — PROGRESS NOTES
[facility] COMPLAINT:   Diagnoses       Codes Comments    Papule     R23.8              HISTORY OF PRESENT ILLNESS    Carl was seen at the behest of Honorio Knowles MD for lesion of his palate.  It is not painful.  Has been present for 1 month.  Not bleeding.  No recent oral trauma. He noticed it because he can feel it with his tongue.   Diagnoses       Codes Comments    Papule     R23.8                REVIEW OF SYSTEMS    Review of Systems as per HPI and PMHx, otherwise 10 system review system are negative.     Patient has no known allergies.     There were no vitals taken for this visit.    HEAD: Normal appearance and symmetry:  No cutaneous lesions.      NECK:  supple     EARS: normal TM, EACs     NOSE:     Dorsum:   straight  Septum:  midline  Mucosa:  moist  Inferior turbinates:  wn        ORAL CAVITY/OROPHARYNX:     Lips:  Normal.  Tongue: normal, midline  Mucosa:   no lesions  Palate: small, 3-4 mm raised area toward midline with central ulceration; non-tender, non-erythematous  Tonsils:  1+     NECK:  Trachea:  midline.              Thyroid:  normal              Adenopathy:  none        NEURO:   Alert and Oriented     GAIT AND STATION:  normal     RESPIRATORY:   Symmetry and Respiratory effort     PSYCH:  Normal mood and affect     SKIN:   warm and dry     Procedure note biopsy of hard palate        After obtaining written consent patient taken the minor procedure room topical benzocaine is placed on the surface of the hard palate.  Is and then injected with 2 cc 1% lidocaine with epinephrine 1/2000.  A 3 mm punch was then used to biopsy the lesion and it is transected with with scissors and passed off the field.  The base is cauterized with silver nitrate with good effect    IMPRESSION:    Encounter Diagnoses   Name Primary?     Papule      Lesion of hard palate Yes          RECOMMENDATIONS:    Patient is cautioned to avoid hot liquids for the next several hours.  Soft diet for the next 3 days.  Salt water  gargles 2-3 times daily for the next several days

## 2021-09-14 LAB
PATH REPORT.COMMENTS IMP SPEC: NORMAL
PATH REPORT.FINAL DX SPEC: NORMAL
PATH REPORT.GROSS SPEC: NORMAL
PATH REPORT.MICROSCOPIC SPEC OTHER STN: NORMAL
PATH REPORT.RELEVANT HX SPEC: NORMAL
PHOTO IMAGE: NORMAL

## 2021-10-10 DIAGNOSIS — E78.5 HYPERLIPIDEMIA LDL GOAL <70: ICD-10-CM

## 2021-10-10 DIAGNOSIS — E78.5 HYPERLIPIDEMIA LDL GOAL <130: Primary | ICD-10-CM

## 2021-10-10 DIAGNOSIS — E78.5 HYPERLIPIDEMIA: ICD-10-CM

## 2021-10-11 RX ORDER — ROSUVASTATIN CALCIUM 20 MG/1
TABLET, COATED ORAL
Qty: 90 TABLET | Refills: 0 | Status: SHIPPED | OUTPATIENT
Start: 2021-10-11 | End: 2021-11-19

## 2021-10-11 NOTE — TELEPHONE ENCOUNTER
"Routing refill request to provider for review/approval because:  Labs not current:  Creatine Kinase    Last Written Prescription Date:  10/21/2020  Last Fill Quantity: 90,  # refills: 3   Last office visit provider:  8/10/2021     Requested Prescriptions   Pending Prescriptions Disp Refills     rosuvastatin (CRESTOR) 20 MG tablet [Pharmacy Med Name: Rosuvastatin Calcium 20 MG Oral Tablet] 90 tablet 0     Sig: TAKE 1 TABLET BY MOUTH IN THE MORNING       Statins Protocol Passed - 10/10/2021  3:54 PM        Passed - LDL on file in past 12 months     Recent Labs   Lab Test 11/17/20  0939   LDL 84             Passed - No abnormal creatine kinase in past 12 months     Recent Labs   Lab Test 12/18/19  1015   CKT 73                Passed - Recent (12 mo) or future (30 days) visit within the authorizing provider's specialty     Patient has had an office visit with the authorizing provider or a provider within the authorizing providers department within the previous 12 mos or has a future within next 30 days. See \"Patient Info\" tab in inbasket, or \"Choose Columns\" in Meds & Orders section of the refill encounter.              Passed - Medication is active on med list        Passed - Patient is age 18 or older             Giana Bearden RN 10/11/21 4:04 PM  "

## 2021-10-16 ENCOUNTER — HEALTH MAINTENANCE LETTER (OUTPATIENT)
Age: 61
End: 2021-10-16

## 2021-11-19 ENCOUNTER — OFFICE VISIT (OUTPATIENT)
Dept: FAMILY MEDICINE | Facility: CLINIC | Age: 61
End: 2021-11-19
Payer: COMMERCIAL

## 2021-11-19 VITALS
SYSTOLIC BLOOD PRESSURE: 124 MMHG | DIASTOLIC BLOOD PRESSURE: 86 MMHG | HEIGHT: 70 IN | HEART RATE: 59 BPM | BODY MASS INDEX: 37.22 KG/M2 | WEIGHT: 260 LBS | RESPIRATION RATE: 16 BRPM

## 2021-11-19 DIAGNOSIS — Z12.11 SCREEN FOR COLON CANCER: ICD-10-CM

## 2021-11-19 DIAGNOSIS — Z00.00 HEALTHCARE MAINTENANCE: Primary | ICD-10-CM

## 2021-11-19 DIAGNOSIS — D12.6 ADENOMATOUS POLYP OF COLON, UNSPECIFIED PART OF COLON: ICD-10-CM

## 2021-11-19 DIAGNOSIS — G89.29 CHRONIC RIGHT-SIDED THORACIC BACK PAIN: ICD-10-CM

## 2021-11-19 DIAGNOSIS — I25.10 CORONARY ARTERY DISEASE INVOLVING NATIVE HEART, UNSPECIFIED VESSEL OR LESION TYPE, UNSPECIFIED WHETHER ANGINA PRESENT: ICD-10-CM

## 2021-11-19 DIAGNOSIS — M25.552 HIP PAIN, LEFT: ICD-10-CM

## 2021-11-19 DIAGNOSIS — Z23 NEED FOR INFLUENZA VACCINATION: ICD-10-CM

## 2021-11-19 DIAGNOSIS — E66.01 MORBID OBESITY (H): ICD-10-CM

## 2021-11-19 DIAGNOSIS — I10 BENIGN ESSENTIAL HYPERTENSION: ICD-10-CM

## 2021-11-19 DIAGNOSIS — E78.5 HYPERLIPIDEMIA LDL GOAL <70: ICD-10-CM

## 2021-11-19 DIAGNOSIS — E78.5 HYPERLIPIDEMIA, UNSPECIFIED HYPERLIPIDEMIA TYPE: ICD-10-CM

## 2021-11-19 DIAGNOSIS — Z12.5 SCREENING PSA (PROSTATE SPECIFIC ANTIGEN): ICD-10-CM

## 2021-11-19 DIAGNOSIS — M54.6 CHRONIC RIGHT-SIDED THORACIC BACK PAIN: ICD-10-CM

## 2021-11-19 LAB
ALBUMIN SERPL-MCNC: 4.3 G/DL (ref 3.5–5)
ALP SERPL-CCNC: 62 U/L (ref 45–120)
ALT SERPL W P-5'-P-CCNC: 31 U/L (ref 0–45)
ANION GAP SERPL CALCULATED.3IONS-SCNC: 12 MMOL/L (ref 5–18)
AST SERPL W P-5'-P-CCNC: NORMAL U/L
BILIRUB SERPL-MCNC: 1 MG/DL (ref 0–1)
BUN SERPL-MCNC: 14 MG/DL (ref 8–22)
CALCIUM SERPL-MCNC: 10.2 MG/DL (ref 8.5–10.5)
CHLORIDE BLD-SCNC: 104 MMOL/L (ref 98–107)
CHOLEST SERPL-MCNC: 157 MG/DL
CO2 SERPL-SCNC: 24 MMOL/L (ref 22–31)
CREAT SERPL-MCNC: 0.86 MG/DL (ref 0.7–1.3)
ERYTHROCYTE [DISTWIDTH] IN BLOOD BY AUTOMATED COUNT: 12.8 % (ref 10–15)
FASTING STATUS PATIENT QL REPORTED: YES
GFR SERPL CREATININE-BSD FRML MDRD: >90 ML/MIN/1.73M2
GLUCOSE BLD-MCNC: 95 MG/DL (ref 70–125)
HBA1C MFR BLD: 5.1 % (ref 0–5.6)
HCT VFR BLD AUTO: 46.7 % (ref 40–53)
HDLC SERPL-MCNC: 52 MG/DL
HGB BLD-MCNC: 16 G/DL (ref 13.3–17.7)
LDLC SERPL CALC-MCNC: 83 MG/DL
MCH RBC QN AUTO: 31 PG (ref 26.5–33)
MCHC RBC AUTO-ENTMCNC: 34.3 G/DL (ref 31.5–36.5)
MCV RBC AUTO: 91 FL (ref 78–100)
PLATELET # BLD AUTO: 187 10E3/UL (ref 150–450)
POTASSIUM BLD-SCNC: NORMAL MMOL/L
PROT SERPL-MCNC: 7.2 G/DL (ref 6–8)
PSA SERPL-MCNC: 0.65 UG/L (ref 0–4.5)
RBC # BLD AUTO: 5.16 10E6/UL (ref 4.4–5.9)
SODIUM SERPL-SCNC: 140 MMOL/L (ref 136–145)
TRIGL SERPL-MCNC: 108 MG/DL
WBC # BLD AUTO: 4.9 10E3/UL (ref 4–11)

## 2021-11-19 PROCEDURE — G0103 PSA SCREENING: HCPCS | Performed by: FAMILY MEDICINE

## 2021-11-19 PROCEDURE — 82310 ASSAY OF CALCIUM: CPT | Performed by: FAMILY MEDICINE

## 2021-11-19 PROCEDURE — 99214 OFFICE O/P EST MOD 30 MIN: CPT | Mod: 25 | Performed by: FAMILY MEDICINE

## 2021-11-19 PROCEDURE — 84155 ASSAY OF PROTEIN SERUM: CPT | Performed by: FAMILY MEDICINE

## 2021-11-19 PROCEDURE — 82247 BILIRUBIN TOTAL: CPT | Performed by: FAMILY MEDICINE

## 2021-11-19 PROCEDURE — 85027 COMPLETE CBC AUTOMATED: CPT | Performed by: FAMILY MEDICINE

## 2021-11-19 PROCEDURE — 84295 ASSAY OF SERUM SODIUM: CPT | Performed by: FAMILY MEDICINE

## 2021-11-19 PROCEDURE — 83036 HEMOGLOBIN GLYCOSYLATED A1C: CPT | Performed by: FAMILY MEDICINE

## 2021-11-19 PROCEDURE — 82565 ASSAY OF CREATININE: CPT | Performed by: FAMILY MEDICINE

## 2021-11-19 PROCEDURE — 82040 ASSAY OF SERUM ALBUMIN: CPT | Performed by: FAMILY MEDICINE

## 2021-11-19 PROCEDURE — 84520 ASSAY OF UREA NITROGEN: CPT | Performed by: FAMILY MEDICINE

## 2021-11-19 PROCEDURE — 84075 ASSAY ALKALINE PHOSPHATASE: CPT | Performed by: FAMILY MEDICINE

## 2021-11-19 PROCEDURE — 82435 ASSAY OF BLOOD CHLORIDE: CPT | Performed by: FAMILY MEDICINE

## 2021-11-19 PROCEDURE — 90471 IMMUNIZATION ADMIN: CPT | Performed by: FAMILY MEDICINE

## 2021-11-19 PROCEDURE — 84460 ALANINE AMINO (ALT) (SGPT): CPT | Performed by: FAMILY MEDICINE

## 2021-11-19 PROCEDURE — 99396 PREV VISIT EST AGE 40-64: CPT | Mod: 25 | Performed by: FAMILY MEDICINE

## 2021-11-19 PROCEDURE — 36415 COLL VENOUS BLD VENIPUNCTURE: CPT | Performed by: FAMILY MEDICINE

## 2021-11-19 PROCEDURE — 82374 ASSAY BLOOD CARBON DIOXIDE: CPT | Performed by: FAMILY MEDICINE

## 2021-11-19 PROCEDURE — 90682 RIV4 VACC RECOMBINANT DNA IM: CPT | Performed by: FAMILY MEDICINE

## 2021-11-19 PROCEDURE — 80061 LIPID PANEL: CPT | Performed by: FAMILY MEDICINE

## 2021-11-19 PROCEDURE — 82947 ASSAY GLUCOSE BLOOD QUANT: CPT | Performed by: FAMILY MEDICINE

## 2021-11-19 RX ORDER — ROSUVASTATIN CALCIUM 20 MG/1
TABLET, COATED ORAL
Qty: 90 TABLET | Refills: 3 | Status: SHIPPED | OUTPATIENT
Start: 2021-11-19 | End: 2022-01-17

## 2021-11-19 RX ORDER — LISINOPRIL AND HYDROCHLOROTHIAZIDE 20; 25 MG/1; MG/1
1 TABLET ORAL DAILY
Qty: 90 TABLET | Refills: 3 | Status: SHIPPED | OUTPATIENT
Start: 2021-11-19 | End: 2022-11-15

## 2021-11-19 ASSESSMENT — MIFFLIN-ST. JEOR: SCORE: 1990.6

## 2021-11-19 NOTE — PATIENT INSTRUCTIONS
Colonoscopy due    Fasting labs    Flu vaccine today and then wait a couple weeks to get your Covid booster    The following high BMI interventions were performed this visit: encouragement to exercise and dietary management education, guidance, and counseling     To see ORTHO regarding your left hip when you are ready.    If ongoing pain in the back then could consider a CT abdomen

## 2021-11-19 NOTE — PROGRESS NOTES
SUBJECTIVE:   CC: Carl Iverson is an 61 year old male who presents for preventative health visit.       Patient has been advised of split billing requirements and indicates understanding: Yes  HPI      Today's PHQ-2 Score:   PHQ-2 ( 1999 Pfizer) 11/18/2021   Q1: Little interest or pleasure in doing things 0   Q2: Feeling down, depressed or hopeless 0   PHQ-2 Score 0   PHQ-2 Total Score (12-17 Years)- Positive if 3 or more points; Administer PHQ-A if positive -   Q1: Little interest or pleasure in doing things Not at all   Q2: Feeling down, depressed or hopeless Not at all   PHQ-2 Score 0       Abuse: Current or Past(Physical, Sexual or Emotional)- No  Do you feel safe in your environment? Yes        Social History     Tobacco Use     Smoking status: Never Smoker     Smokeless tobacco: Never Used   Substance Use Topics     Alcohol use: Yes     Alcohol/week: 10.0 standard drinks     Never smoker  Alcohol about 10 per week      Alcohol Use 11/18/2021   Prescreen: >3 drinks/day or >7 drinks/week? No       Last PSA:   Prostate Specific Antigen Screen   Date Value Ref Range Status   11/17/2020 0.7 0.0 - 4.5 ng/mL Final         Reviewed and updated as needed this visit by clinical staff  Tobacco  Allergies  Meds             Reviewed and updated as needed this visit by Provider                 Review of Systems  CONSTITUTIONAL: NEGATIVE for fever, chills, change in weight  INTEGUMENTARY/SKIN: NEGATIVE for worrisome rashes, moles or lesions  EYES: NEGATIVE for vision changes or irritation  ENT: NEGATIVE for ear, mouth and throat problems  RESP: NEGATIVE for significant cough or SOB  CV: NEGATIVE for chest pain, palpitations or peripheral edema  GI: NEGATIVE for nausea, abdominal pain, heartburn, or change in bowel habits   male: negative for dysuria, hematuria, decreased urinary stream, erectile dysfunction, urethral discharge  MUSCULOSKELETAL: NEGATIVE for significant arthralgias or myalgia  NEURO: NEGATIVE for  "weakness, dizziness or paresthesias  PSYCHIATRIC: NEGATIVE for changes in mood or affect    Muscles sometimes feel sore.  Left hip is painful for about a year.  Also c/o pain in right mid back for a couple years.       OBJECTIVE:   /86   Pulse 59   Resp 16   Ht 1.778 m (5' 10\")   Wt 117.9 kg (260 lb)   BMI 37.31 kg/m    Wt Readings from Last 4 Encounters:   11/19/21 117.9 kg (260 lb)   08/10/21 116.8 kg (257 lb 9.6 oz)   12/21/20 116.1 kg (256 lb)   11/17/20 114.9 kg (253 lb 6.4 oz)     Physical Exam  GENERAL: healthy, alert and no distress  EYES: Eyes grossly normal to inspection, PERRL and conjunctivae and sclerae normal  HENT: ear canals and TM's normal, nose and mouth without ulcers or lesions  NECK: no adenopathy, no asymmetry, masses, or scars and thyroid normal to palpation  RESP: lungs clear to auscultation - no rales, rhonchi or wheezes  CV: regular rate and rhythm, normal S1 S2, no S3 or S4, no murmur, click or rub, no peripheral edema and peripheral pulses strong  ABDOMEN: soft, nontender, no hepatosplenomegaly, no masses and bowel sounds normal  RECTAL: exam declined in lieu of PSA testing.   MS: no gross musculoskeletal defects noted, no edema  SKIN: no suspicious lesions or rashes  NEURO: Normal strength and tone, mentation intact and speech normal  PSYCH: mentation appears normal, affect normal/bright    Prostate Specific Antigen Screen   Date Value Ref Range Status   11/17/2020 0.7 0.0 - 4.5 ng/mL Final     Last Comprehensive Metabolic Panel:  Sodium   Date Value Ref Range Status   11/17/2020 141 136 - 145 mmol/L Final     Potassium   Date Value Ref Range Status   11/17/2020 4.4 3.5 - 5.0 mmol/L Final     Chloride   Date Value Ref Range Status   11/17/2020 103 98 - 107 mmol/L Final     Carbon Dioxide (CO2)   Date Value Ref Range Status   11/17/2020 29 22 - 31 mmol/L Final     Anion Gap   Date Value Ref Range Status   11/17/2020 9 5 - 18 mmol/L Final     Glucose   Date Value Ref Range " Status   11/17/2020 103 70 - 125 mg/dL Final     Urea Nitrogen   Date Value Ref Range Status   11/17/2020 17 8 - 22 mg/dL Final     Creatinine   Date Value Ref Range Status   11/17/2020 0.91 0.70 - 1.30 mg/dL Final     GFR Estimate   Date Value Ref Range Status   11/17/2020 >60 >60 mL/min/1.73m2 Final     Calcium   Date Value Ref Range Status   11/17/2020 9.9 8.5 - 10.5 mg/dL Final     Bilirubin Total   Date Value Ref Range Status   11/17/2020 0.9 0.0 - 1.0 mg/dL Final     Alkaline Phosphatase   Date Value Ref Range Status   11/17/2020 58 45 - 120 U/L Final     ALT   Date Value Ref Range Status   11/17/2020 28 0 - 45 U/L Final     AST   Date Value Ref Range Status   11/17/2020 20 0 - 40 U/L Final       No results found for: A1C  Lab Results   Component Value Date    CHOL 155 11/17/2020     Lab Results   Component Value Date    HDL 53 11/17/2020     Lab Results   Component Value Date    LDL 84 11/17/2020    LDL 86 02/05/2010     Lab Results   Component Value Date    TRIG 91 11/17/2020     No results found for: CHOLHDLRATIO      EXAM: XR THORACIC SPINE 2 VIEWS  LOCATION: [facility]  DATE/TIME: 11/19/2021 8:59 AM     INDICATION: Chronic right-sided thoracic back pain.  COMPARISON: Chest CT 02/10/2015.  TECHNIQUE: CR Thoracic Spine.                                                                      IMPRESSION:   The upper thoracic vertebral bodies are partially obscured on the lateral view. Normal vertebral body heights and alignment. Mild disc space narrowing with bulky right anterolateral osteophytes throughout the mid and lower thoracic spine. The visualized   ribs and intrathoracic structures are unremarkable.     Left hip with moderate osteoarthritis.  CXR clear with ?  mild heart enlargment       Order-Level Documents            ASSESSMENT/PLAN:     Encounter Diagnoses   Name Primary?     Healthcare maintenance Yes     Adenomatous polyp of colon, unspecified part of colon       "Hyperlipidemia, unspecified hyperlipidemia type, contreolled on a stastin      Coronary artery disease involving native heart, unspecified vessel or lesion type, unspecified whether angina present, asx      Screening PSA (prostate specific antigen)      Screen for colon cancer      Hyperlipidemia LDL goal <70, stable on statin      Need for influenza vaccination      Benign essential hypertension, well controlled      Obesity (BMI 35.0-39.9) with comorbidity (H)      Hip pain, left      Chronic right-sided thoracic back pain, osteoarthritis of the thoracic spine (also of the right hip)       PLAN:   Colonoscopy due    Fasting labs    Flu vaccine today and then wait a couple weeks to get your Covid booster     The following high BMI interventions were performed this visit: encouragement to exercise and dietary management education, guidance, and counseling    To see ORTHO regarding your left hip when you are ready.    If ongoing pain in the back then could consider a CT abdomen    The xrays were personally read (see above)  and reviewed with the pt        Patient has been advised of split billing requirements and indicates understanding: Yes  COUNSELING:   Reviewed preventive health counseling, as reflected in patient instructions       Regular exercise       Healthy diet/nutrition       Colon cancer screening       Prostate cancer screening    Estimated body mass index is 37.31 kg/m  as calculated from the following:    Height as of this encounter: 1.778 m (5' 10\").    Weight as of this encounter: 117.9 kg (260 lb).     Weight management plan: Discussed healthy diet and exercise guidelines    He reports that he has never smoked. He has never used smokeless tobacco.    NM MPI Treadmill  Order: [id number]   Status: Final result     Visible to patient: Yes (seen)     Next appt: None     Dx: Pre-operative examination; Coronary a...     0 Result Notes     1 Patient Communication    Details    Reading Physician Reading " Date Result Priority   Jonathan Ayala DO  [phone] 6/3/2019 Routine   Provider, Historical 6/3/2019      Result Text    The exercise nuclear stress test is negative for inducible myocardial ischemia or infarction.    The patient's exercise capacity is normal.    The left ventricular ejection fraction is 70%.    There is no prior study available.           Honorio Knowles MD  [facility]

## 2021-11-19 NOTE — LETTER
November 21, 2021      Carl Iverson  [address] [address] [address]        Dear ,  We are writing to inform you of your test results.    Mohan Harris:  Your PSA is normal and stable.   Recheck it in one year.  The cholesterol panel is excellent.  The remaining labs are NORMAL.  It was a pleasure seeing you in clinic this past week.    Resulted Orders   PSA, screen   Result Value Ref Range    Prostate Specific Antigen Screen 0.65 0.00 - 4.50 ug/L    Narrative    Assay Method is Abbott Prostate-Specific Antigen (PSA)  Standard-WHO 1st International (90:10)   Comprehensive metabolic panel (BMP + Alb, Alk Phos, ALT, AST, Total. Bili, TP)   Result Value Ref Range    Sodium 140 136 - 145 mmol/L    Potassium        Comment:      Specimen hemolyzed, result invalid    Chloride 104 98 - 107 mmol/L    Carbon Dioxide (CO2) 24 22 - 31 mmol/L    Anion Gap 12 5 - 18 mmol/L    Urea Nitrogen 14 8 - 22 mg/dL    Creatinine 0.86 0.70 - 1.30 mg/dL    Calcium 10.2 8.5 - 10.5 mg/dL    Glucose 95 70 - 125 mg/dL    Alkaline Phosphatase 62 45 - 120 U/L    AST        Comment:      Specimen hemolyzed, result invalid    ALT 31 0 - 45 U/L    Protein Total 7.2 6.0 - 8.0 g/dL    Albumin 4.3 3.5 - 5.0 g/dL    Bilirubin Total 1.0 0.0 - 1.0 mg/dL    GFR Estimate >90 >60 mL/min/1.73m2      Comment:      As of July 11, 2021, eGFR is calculated by the CKD-EPI creatinine equation, without race adjustment. eGFR can be influenced by muscle mass, exercise, and diet. The reported eGFR is an estimation only and is only applicable if the renal function is stable.   CBC with platelets   Result Value Ref Range    WBC Count 4.9 4.0 - 11.0 10e3/uL    RBC Count 5.16 4.40 - 5.90 10e6/uL    Hemoglobin 16.0 13.3 - 17.7 g/dL    Hematocrit 46.7 40.0 - 53.0 %    MCV 91 78 - 100 fL    MCH 31.0 26.5 - 33.0 pg    MCHC 34.3 31.5 - 36.5 g/dL    RDW 12.8 10.0 - 15.0 %    Platelet Count 187 150 - 450 10e3/uL   Lipid panel reflex to direct LDL Fasting   Result Value  Ref Range    Cholesterol 157 <=199 mg/dL    Triglycerides 108 <=149 mg/dL    Direct Measure HDL 52 >=40 mg/dL      Comment:      HDL Cholesterol Reference Range:     0-2 years:   No reference ranges established for patients under 2 years old  [facility] for lipid analytes.    2-8 years:  Greater than 45 mg/dL     18 years and older:   Female: Greater than or equal to 50 mg/dL   Male:   Greater than or equal to 40 mg/dL    LDL Cholesterol Calculated 83 <=129 mg/dL    Patient Fasting > 8hrs? Yes    Hemoglobin A1c   Result Value Ref Range    Hemoglobin A1C 5.1 0.0 - 5.6 %      Comment:      Normal <5.7%   Prediabetes 5.7-6.4%    Diabetes 6.5% or higher     Note: Adopted from ADA consensus guidelines.       If you have any questions or concerns, please call the clinic at the number listed above.       Sincerely,      Honorio Knowles MD

## 2022-01-14 ENCOUNTER — TRANSFERRED RECORDS (OUTPATIENT)
Dept: HEALTH INFORMATION MANAGEMENT | Facility: CLINIC | Age: 62
End: 2022-01-14
Payer: COMMERCIAL

## 2022-01-17 DIAGNOSIS — E78.5 HYPERLIPIDEMIA LDL GOAL <70: ICD-10-CM

## 2022-01-17 RX ORDER — ROSUVASTATIN CALCIUM 20 MG/1
TABLET, COATED ORAL
Qty: 90 TABLET | Refills: 3 | Status: SHIPPED | OUTPATIENT
Start: 2022-01-17 | End: 2023-01-02

## 2022-04-22 ENCOUNTER — ALLIED HEALTH/NURSE VISIT (OUTPATIENT)
Dept: FAMILY MEDICINE | Facility: CLINIC | Age: 62
End: 2022-04-22
Payer: COMMERCIAL

## 2022-04-22 DIAGNOSIS — Z23 COVID-19 VACCINE ADMINISTERED: Primary | ICD-10-CM

## 2022-04-22 PROCEDURE — 0054A COVID-19,PF,PFIZER (12+ YRS): CPT

## 2022-04-22 PROCEDURE — 91305 COVID-19,PF,PFIZER (12+ YRS): CPT

## 2022-05-02 ENCOUNTER — MYC MEDICAL ADVICE (OUTPATIENT)
Dept: FAMILY MEDICINE | Facility: CLINIC | Age: 62
End: 2022-05-02
Payer: COMMERCIAL

## 2022-05-10 NOTE — TELEPHONE ENCOUNTER
Patient's wife Amy Iverson will come in 5/11 around 4pm to  Letter of Recovery.    Abdiel Dejesus RN     [facility]

## 2022-09-25 ENCOUNTER — HEALTH MAINTENANCE LETTER (OUTPATIENT)
Age: 62
End: 2022-09-25

## 2022-11-15 DIAGNOSIS — I10 BENIGN ESSENTIAL HYPERTENSION: ICD-10-CM

## 2022-11-15 RX ORDER — LISINOPRIL AND HYDROCHLOROTHIAZIDE 20; 25 MG/1; MG/1
1 TABLET ORAL DAILY
Qty: 90 TABLET | Refills: 0 | Status: SHIPPED | OUTPATIENT
Start: 2022-11-15 | End: 2023-01-02

## 2022-12-19 ASSESSMENT — ENCOUNTER SYMPTOMS
NERVOUS/ANXIOUS: 0
HEARTBURN: 0
DYSURIA: 0
DIZZINESS: 0
HEMATURIA: 0
COUGH: 0
CHILLS: 0
PARESTHESIAS: 0
ARTHRALGIAS: 0
HEADACHES: 0
CONSTIPATION: 0
EYE PAIN: 0
FEVER: 0
FREQUENCY: 0
PALPITATIONS: 0
JOINT SWELLING: 0
WEAKNESS: 0
SORE THROAT: 0
ABDOMINAL PAIN: 0
DIARRHEA: 0
SHORTNESS OF BREATH: 0
NAUSEA: 0
HEMATOCHEZIA: 0
MYALGIAS: 0

## 2022-12-21 ENCOUNTER — OFFICE VISIT (OUTPATIENT)
Dept: FAMILY MEDICINE | Facility: CLINIC | Age: 62
End: 2022-12-21
Payer: COMMERCIAL

## 2022-12-21 VITALS
BODY MASS INDEX: 36.54 KG/M2 | HEART RATE: 64 BPM | DIASTOLIC BLOOD PRESSURE: 84 MMHG | OXYGEN SATURATION: 99 % | RESPIRATION RATE: 16 BRPM | HEIGHT: 70 IN | WEIGHT: 255.2 LBS | SYSTOLIC BLOOD PRESSURE: 122 MMHG

## 2022-12-21 DIAGNOSIS — Z00.00 HEALTHCARE MAINTENANCE: ICD-10-CM

## 2022-12-21 DIAGNOSIS — E66.01 MORBID OBESITY (H): ICD-10-CM

## 2022-12-21 DIAGNOSIS — Z12.5 SCREENING PSA (PROSTATE SPECIFIC ANTIGEN): ICD-10-CM

## 2022-12-21 DIAGNOSIS — Z23 NEED FOR INFLUENZA VACCINATION: ICD-10-CM

## 2022-12-21 DIAGNOSIS — I10 ESSENTIAL HYPERTENSION: ICD-10-CM

## 2022-12-21 LAB
ALBUMIN SERPL BCG-MCNC: 4.2 G/DL (ref 3.5–5.2)
ALP SERPL-CCNC: 57 U/L (ref 40–129)
ALT SERPL W P-5'-P-CCNC: 28 U/L (ref 10–50)
ANION GAP SERPL CALCULATED.3IONS-SCNC: 14 MMOL/L (ref 7–15)
AST SERPL W P-5'-P-CCNC: 26 U/L (ref 10–50)
BILIRUB SERPL-MCNC: 0.6 MG/DL
BUN SERPL-MCNC: 12.2 MG/DL (ref 8–23)
CALCIUM SERPL-MCNC: 9.8 MG/DL (ref 8.8–10.2)
CHLORIDE SERPL-SCNC: 100 MMOL/L (ref 98–107)
CHOLEST SERPL-MCNC: 157 MG/DL
CREAT SERPL-MCNC: 0.89 MG/DL (ref 0.67–1.17)
DEPRECATED HCO3 PLAS-SCNC: 26 MMOL/L (ref 22–29)
ERYTHROCYTE [DISTWIDTH] IN BLOOD BY AUTOMATED COUNT: 12.5 % (ref 10–15)
GFR SERPL CREATININE-BSD FRML MDRD: >90 ML/MIN/1.73M2
GLUCOSE SERPL-MCNC: 99 MG/DL (ref 70–99)
HCT VFR BLD AUTO: 45.6 % (ref 40–53)
HDLC SERPL-MCNC: 44 MG/DL
HGB BLD-MCNC: 15.9 G/DL (ref 13.3–17.7)
LDLC SERPL CALC-MCNC: 93 MG/DL
MCH RBC QN AUTO: 31.2 PG (ref 26.5–33)
MCHC RBC AUTO-ENTMCNC: 34.9 G/DL (ref 31.5–36.5)
MCV RBC AUTO: 89 FL (ref 78–100)
NONHDLC SERPL-MCNC: 113 MG/DL
PLATELET # BLD AUTO: 190 10E3/UL (ref 150–450)
POTASSIUM SERPL-SCNC: 4.1 MMOL/L (ref 3.4–5.3)
PROT SERPL-MCNC: 6.8 G/DL (ref 6.4–8.3)
PSA SERPL-MCNC: 0.68 NG/ML (ref 0–4.5)
RBC # BLD AUTO: 5.1 10E6/UL (ref 4.4–5.9)
SODIUM SERPL-SCNC: 140 MMOL/L (ref 136–145)
TRIGL SERPL-MCNC: 99 MG/DL
WBC # BLD AUTO: 4.2 10E3/UL (ref 4–11)

## 2022-12-21 PROCEDURE — 36415 COLL VENOUS BLD VENIPUNCTURE: CPT | Performed by: FAMILY MEDICINE

## 2022-12-21 PROCEDURE — 85027 COMPLETE CBC AUTOMATED: CPT | Performed by: FAMILY MEDICINE

## 2022-12-21 PROCEDURE — 80061 LIPID PANEL: CPT | Performed by: FAMILY MEDICINE

## 2022-12-21 PROCEDURE — 99396 PREV VISIT EST AGE 40-64: CPT | Performed by: FAMILY MEDICINE

## 2022-12-21 PROCEDURE — G0103 PSA SCREENING: HCPCS | Performed by: FAMILY MEDICINE

## 2022-12-21 PROCEDURE — 99213 OFFICE O/P EST LOW 20 MIN: CPT | Mod: 25 | Performed by: FAMILY MEDICINE

## 2022-12-21 PROCEDURE — 80053 COMPREHEN METABOLIC PANEL: CPT | Performed by: FAMILY MEDICINE

## 2022-12-21 PROCEDURE — 82306 VITAMIN D 25 HYDROXY: CPT | Performed by: FAMILY MEDICINE

## 2022-12-21 ASSESSMENT — ENCOUNTER SYMPTOMS
CHILLS: 0
WEAKNESS: 0
EYE PAIN: 0
MYALGIAS: 0
DYSURIA: 0
CONSTIPATION: 0
HEARTBURN: 0
DIZZINESS: 0
ARTHRALGIAS: 0
NAUSEA: 0
DIARRHEA: 0
PARESTHESIAS: 0
FREQUENCY: 0
NERVOUS/ANXIOUS: 0
SHORTNESS OF BREATH: 0
FEVER: 0
HEMATOCHEZIA: 0
PALPITATIONS: 0
JOINT SWELLING: 0
HEADACHES: 0
SORE THROAT: 0
HEMATURIA: 0
COUGH: 0
ABDOMINAL PAIN: 0

## 2022-12-21 NOTE — PATIENT INSTRUCTIONS
Flu vaccine in a week.    Fasting labs    Colonoscopy due Jan 2027    The following high BMI interventions were performed this visit: encouragement to exercise and weight loss from baseline weight

## 2022-12-21 NOTE — LETTER
December 22, 2022      Carl Iverson  [address] [facility] [address]        Dear ,  We are writing to inform you of your test results.    Hi Carl:  Your PSA is normal and stable;  Please recheck the PSA in one year.  Your cholesterol panel is good though Cardiology usually wants that LDL below 70.  You and I could further discuss this or you could discuss with Cardiology.  Present Vit D supplementation looks good.  The remaining labs are normal.    Resulted Orders   Comprehensive metabolic panel (BMP + Alb, Alk Phos, ALT, AST, Total. Bili, TP)   Result Value Ref Range    Sodium 140 136 - 145 mmol/L    Potassium 4.1 3.4 - 5.3 mmol/L    Chloride 100 98 - 107 mmol/L    Carbon Dioxide (CO2) 26 22 - 29 mmol/L    Anion Gap 14 7 - 15 mmol/L    Urea Nitrogen 12.2 8.0 - 23.0 mg/dL    Creatinine 0.89 0.67 - 1.17 mg/dL    Calcium 9.8 8.8 - 10.2 mg/dL    Glucose 99 70 - 99 mg/dL    Alkaline Phosphatase 57 40 - 129 U/L    AST 26 10 - 50 U/L    ALT 28 10 - 50 U/L    Protein Total 6.8 6.4 - 8.3 g/dL    Albumin 4.2 3.5 - 5.2 g/dL    Bilirubin Total 0.6 <=1.2 mg/dL    GFR Estimate >90 >60 mL/min/1.73m2      Comment:      Effective December 21, 2021 eGFRcr in adults is calculated using the 2021 CKD-EPI creatinine equation which includes age and gender (Juan Diego villarreal al., [place], DOI: 10.1056/QSRBoh4321567)   Lipid panel   Result Value Ref Range    Cholesterol 157 <200 mg/dL    Triglycerides 99 <150 mg/dL    Direct Measure HDL 44 >=40 mg/dL    LDL Cholesterol Calculated 93 <=100 mg/dL    Non HDL Cholesterol 113 <130 mg/dL    Narrative    Cholesterol  Desirable:  <200 mg/dL    Triglycerides  Normal:  Less than 150 mg/dL  Borderline High:  150-199 mg/dL  High:  200-499 mg/dL  Very High:  Greater than or equal to 500 mg/dL    Direct Measure HDL  Female:  Greater than or equal to 50 mg/dL   Male:  Greater than or equal to 40 mg/dL    LDL Cholesterol  Desirable:  <100mg/dL  Above Desirable:  100-129 mg/dL   Borderline High:   130-159 mg/dL   High:  160-189 mg/dL   Very High:  >= 190 mg/dL    Non HDL Cholesterol  Desirable:  130 mg/dL  Above Desirable:  130-159 mg/dL  Borderline High:  160-189 mg/dL  High:  190-219 mg/dL  Very High:  Greater than or equal to 220 mg/dL   PSA, screen   Result Value Ref Range    Prostate Specific Antigen Screen 0.68 0.00 - 4.50 ng/mL    Narrative    This result is obtained using the Roche Elecsys total PSA method on the leonardo e801 immunoassay analyzer. Results obtained with different assay methods or kits cannot be used interchangeably.   CBC with platelets   Result Value Ref Range    WBC Count 4.2 4.0 - 11.0 10e3/uL    RBC Count 5.10 4.40 - 5.90 10e6/uL    Hemoglobin 15.9 13.3 - 17.7 g/dL    Hematocrit 45.6 40.0 - 53.0 %    MCV 89 78 - 100 fL    MCH 31.2 26.5 - 33.0 pg    MCHC 34.9 31.5 - 36.5 g/dL    RDW 12.5 10.0 - 15.0 %    Platelet Count 190 150 - 450 10e3/uL   Vitamin D Deficiency   Result Value Ref Range    Vitamin D, Total (25-Hydroxy) 59 20 - 75 ug/L    Narrative    Season, race, dietary intake, and treatment affect the concentration of 25-hydroxy-Vitamin D. Values may decrease during winter months and increase during summer months. Values 20-29 ug/L may indicate Vitamin D insufficiency and values <20 ug/L may indicate Vitamin D deficiency.    Vitamin D determination is routinely performed by an immunoassay specific for 25 hydroxyvitamin D3.  If an individual is on vitamin D2(ergocalciferol) supplementation, please specify 25 OH vitamin D2 and D3 level determination by LCMSMS test [id number].         If you have any questions or concerns, please call the clinic at the number listed above.       Sincerely,      Honorio Knowles MD

## 2022-12-21 NOTE — PROGRESS NOTES
SUBJECTIVE:   CC: Carl is an 62 year old who presents for preventative health visit.   Patient has been advised of split billing requirements and indicates understanding: Yes  Healthy Habits:     Getting at least 3 servings of Calcium per day:  Yes    Bi-annual eye exam:  NO    Dental care twice a year:  Yes    Sleep apnea or symptoms of sleep apnea:  None    Diet:  Regular (no restrictions)    Frequency of exercise:  4-5 days/week    Duration of exercise:  15-30 minutes    Taking medications regularly:  Yes    Barriers to taking medications:  None    Medication side effects:  None    PHQ-2 Total Score: 0    Additional concerns today:  No              Today's PHQ-2 Score:   PHQ-2 ( 1999 [employer]) 12/19/2022   Q1: Little interest or pleasure in doing things 0   Q2: Feeling down, depressed or hopeless 0   PHQ-2 Score 0   PHQ-2 Total Score (12-17 Years)- Positive if 3 or more points; Administer PHQ-A if positive -   Q1: Little interest or pleasure in doing things Not at all   Q2: Feeling down, depressed or hopeless Not at all   PHQ-2 Score 0           Social History     Tobacco Use     Smoking status: Never     Smokeless tobacco: Never   Substance Use Topics     Alcohol use: Yes     Alcohol/week: 10.0 standard drinks         No flowsheet data found.    Last PSA:   Prostate Specific Antigen Screen   Date Value Ref Range Status   11/19/2021 0.65 0.00 - 4.50 ug/L Final       Reviewed orders with patient. Reviewed health maintenance and updated orders accordingly - Yes      Reviewed and updated as needed this visit by clinical staff    Allergies  Meds              Reviewed and updated as needed this visit by Provider                     Review of Systems   Constitutional: Negative for chills and fever.   HENT: Negative for congestion, ear pain, hearing loss and sore throat.    Eyes: Negative for pain and visual disturbance.   Respiratory: Negative for cough and shortness of breath.    Cardiovascular: Negative for chest  "pain, palpitations and peripheral edema.   Gastrointestinal: Negative for abdominal pain, constipation, diarrhea, heartburn, hematochezia and nausea.   Genitourinary: Negative for dysuria, frequency, genital sores, hematuria, impotence, penile discharge and urgency.   Musculoskeletal: Negative for arthralgias, joint swelling and myalgias.   Skin: Negative for rash.   Neurological: Negative for dizziness, weakness, headaches and paresthesias.   Psychiatric/Behavioral: Negative for mood changes. The patient is not nervous/anxious.        OBJECTIVE:   /84 (BP Location: Left arm, Patient Position: Sitting, Cuff Size: Adult Large)   Pulse 64   Resp 16   Ht 1.778 m (5' 10\")   Wt 115.8 kg (255 lb 3.2 oz)   SpO2 99%   BMI 36.62 kg/m        Wt Readings from Last 4 Encounters:   12/21/22 115.8 kg (255 lb 3.2 oz)   11/19/21 117.9 kg (260 lb)   08/10/21 116.8 kg (257 lb 9.6 oz)   12/21/20 116.1 kg (256 lb)     Physical Exam  GENERAL: healthy, alert and no distress  EYES: Eyes grossly normal to inspection, PERRL and conjunctivae and sclerae normal  HENT: ear canals and TM's normal, nose and mouth without ulcers or lesions  NECK: no adenopathy, no asymmetry, masses, or scars and thyroid normal to palpation  RESP: lungs clear to auscultation - no rales, rhonchi or wheezes  CV: regular rate and rhythm, normal S1 S2, no S3 or S4, no murmur, click or rub, no peripheral edema and peripheral pulses strong  ABDOMEN: soft, nontender, no hepatosplenomegaly, no masses and bowel sounds normal  RECTAL: EXAM DECLINED.  Will do yearly PSA  MS: no gross musculoskeletal defects noted, no edema  SKIN: no suspicious lesions or rashes  NEURO: Normal strength and tone, mentation intact and speech normal  PSYCH: mentation appears normal, affect normal/bright    Prostate Specific Antigen Screen   Date Value Ref Range Status   11/19/2021 0.65 0.00 - 4.50 ug/L Final         ASSESSMENT/PLAN:       ICD-10-CM    1. Healthcare maintenance  " "Z00.00 Lipid panel     CBC with platelets     Vitamin D Deficiency      2. Hypertension, controlled I10 Comprehensive metabolic panel (BMP + Alb, Alk Phos, ALT, AST, Total. Bili, TP)      3. Need for influenza vaccination, will get in a couple weeks as was just recently ill  Z23 INFLUENZA VACCINE 50-64 OR 18-64 W/EGG ALLERGY (FLUBLOK)      4. Screening PSA (prostate specific antigen)  Z12.5 PSA, screen      5. Obesity (BMI 35.0-39.9) with comorbidity (H)  E66.01           PLAN:   Flu vaccine in a week.    Fasting labs    Colonoscopy due Jan 2027    The following high BMI interventions were performed this visit: encouragement to exercise and weight loss from baseline weight      Patient has been advised of split billing requirements and indicates understanding: Yes      COUNSELING:   Reviewed preventive health counseling, as reflected in patient instructions       Regular exercise       Healthy diet/nutrition       Colorectal cancer screening       Prostate cancer screening      BMI:   Estimated body mass index is 36.62 kg/m  as calculated from the following:    Height as of this encounter: 1.778 m (5' 10\").    Weight as of this encounter: 115.8 kg (255 lb 3.2 oz).   Weight management plan: Discussed healthy diet and exercise guidelines      He reports that he has never smoked. He has never used smokeless tobacco.      Honorio Knowles MD  [facility]  "

## 2022-12-22 LAB — DEPRECATED CALCIDIOL+CALCIFEROL SERPL-MC: 59 UG/L (ref 20–75)

## 2023-01-02 DIAGNOSIS — I10 BENIGN ESSENTIAL HYPERTENSION: ICD-10-CM

## 2023-01-02 DIAGNOSIS — E78.5 HYPERLIPIDEMIA LDL GOAL <70: ICD-10-CM

## 2023-01-02 RX ORDER — ROSUVASTATIN CALCIUM 20 MG/1
TABLET, COATED ORAL
Qty: 90 TABLET | Refills: 3 | Status: SHIPPED | OUTPATIENT
Start: 2023-01-02 | End: 2023-12-28

## 2023-01-02 RX ORDER — LISINOPRIL AND HYDROCHLOROTHIAZIDE 20; 25 MG/1; MG/1
TABLET ORAL
Qty: 90 TABLET | Refills: 3 | Status: SHIPPED | OUTPATIENT
Start: 2023-01-02 | End: 2023-12-28

## 2023-01-03 NOTE — TELEPHONE ENCOUNTER
"Last Written Prescription Date:  11/15/22  Last Fill Quantity: 90,  # refills: 0   Last office visit provider:  12/21/22     Requested Prescriptions   Pending Prescriptions Disp Refills     lisinopril-hydrochlorothiazide (ZESTORETIC) 20-25 MG tablet [Pharmacy Med Name: Lisinopril-hydroCHLOROthiazide 20-25 MG Oral Tablet] 90 tablet 0     Sig: Take 1 tablet by mouth once daily       Diuretics (Including Combos) Protocol Passed - 1/2/2023 10:57 AM        Passed - Blood pressure under 140/90 in past 12 months     BP Readings from Last 3 Encounters:   12/21/22 122/84   11/19/21 124/86   08/10/21 121/85                 Passed - Recent (12 mo) or future (30 days) visit within the authorizing provider's specialty     Patient has had an office visit with the authorizing provider or a provider within the authorizing providers department within the previous 12 mos or has a future within next 30 days. See \"Patient Info\" tab in inbasket, or \"Choose Columns\" in Meds & Orders section of the refill encounter.              Passed - Medication is active on med list        Passed - Patient is age 18 or older        Passed - Normal serum creatinine on file in past 12 months     Recent Labs   Lab Test 12/21/22  1046   CR 0.89              Passed - Normal serum potassium on file in past 12 months     Recent Labs   Lab Test 12/21/22  1046   POTASSIUM 4.1                    Passed - Normal serum sodium on file in past 12 months     Recent Labs   Lab Test 12/21/22  1046                ACE Inhibitors (Including Combos) Protocol Passed - 1/2/2023 10:57 AM        Passed - Blood pressure under 140/90 in past 12 months     BP Readings from Last 3 Encounters:   12/21/22 122/84   11/19/21 124/86   08/10/21 121/85                 Passed - Recent (12 mo) or future (30 days) visit within the authorizing provider's specialty     Patient has had an office visit with the authorizing provider or a provider within the authorizing providers " "department within the previous 12 mos or has a future within next 30 days. See \"Patient Info\" tab in inbasket, or \"Choose Columns\" in Meds & Orders section of the refill encounter.              Passed - Medication is active on med list        Passed - Patient is age 18 or older        Passed - Normal serum creatinine on file in past 12 months     Recent Labs   Lab Test 12/21/22  1046   CR 0.89       Ok to refill medication if creatinine is low          Passed - Normal serum potassium on file in past 12 months     Recent Labs   Lab Test 12/21/22  1046   POTASSIUM 4.1                rosuvastatin (CRESTOR) 20 MG tablet [Pharmacy Med Name: Rosuvastatin Calcium 20 MG Oral Tablet] 90 tablet 0     Sig: TAKE 1 TABLET BY MOUTH IN THE MORNING       Statins Protocol Passed - 1/2/2023 10:57 AM        Passed - LDL on file in past 12 months     Recent Labs   Lab Test 12/21/22  1046   LDL 93             Passed - No abnormal creatine kinase in past 12 months     Recent Labs   Lab Test 12/18/19  1015   CKT 73                Passed - Recent (12 mo) or future (30 days) visit within the authorizing provider's specialty     Patient has had an office visit with the authorizing provider or a provider within the authorizing providers department within the previous 12 mos or has a future within next 30 days. See \"Patient Info\" tab in inbasket, or \"Choose Columns\" in Meds & Orders section of the refill encounter.              Passed - Medication is active on med list        Passed - Patient is age 18 or older             Valarie Fan RN 01/02/23 6:04 PM  "

## 2023-06-05 ENCOUNTER — TRANSFERRED RECORDS (OUTPATIENT)
Dept: HEALTH INFORMATION MANAGEMENT | Facility: CLINIC | Age: 63
End: 2023-06-05
Payer: COMMERCIAL

## 2023-08-10 ENCOUNTER — OFFICE VISIT (OUTPATIENT)
Dept: FAMILY MEDICINE | Facility: CLINIC | Age: 63
End: 2023-08-10
Payer: COMMERCIAL

## 2023-08-10 VITALS
WEIGHT: 258.38 LBS | HEART RATE: 71 BPM | RESPIRATION RATE: 16 BRPM | OXYGEN SATURATION: 99 % | TEMPERATURE: 97.9 F | DIASTOLIC BLOOD PRESSURE: 89 MMHG | SYSTOLIC BLOOD PRESSURE: 133 MMHG | BODY MASS INDEX: 37.07 KG/M2

## 2023-08-10 DIAGNOSIS — S20.362A INSECT BITE OF LEFT FRONT WALL OF THORAX, INITIAL ENCOUNTER: Primary | ICD-10-CM
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
## 2023-08-10 PROCEDURE — 36415 COLL VENOUS BLD VENIPUNCTURE: CPT | Performed by: FAMILY MEDICINE

## 2023-08-10 PROCEDURE — 99213 OFFICE O/P EST LOW 20 MIN: CPT | Performed by: FAMILY MEDICINE

## 2023-08-10 PROCEDURE — 86618 LYME DISEASE ANTIBODY: CPT | Performed by: FAMILY MEDICINE

## 2023-08-10 NOTE — PROGRESS NOTES
Eric Henry is a 63 year old, presenting for the following health issues:  Insect Bites (BUG BITE ON LEFT SIDE X 3 WEEKS-ITCHY ) and Shoulder (LEFT SHOULDER PAIN X 1 YEAR)        8/10/2023     4:48 PM   Additional Questions   Roomed by MARICARMEN SOL   Accompanied by RANJAN       History of Present Illness       Reason for visit:  Tick bite, shoulder pain    He eats 2-3 servings of fruits and vegetables daily.He consumes 0 sweetened beverage(s) daily.He exercises with enough effort to increase his heart rate 30 to 60 minutes per day.  He exercises with enough effort to increase his heart rate 6 days per week.   He is taking medications regularly.         Objective    /89 (BP Location: Left arm, Patient Position: Sitting, Cuff Size: Adult Large)   Pulse 71   Temp 97.9  F (36.6  C) (Oral)   Resp 16   Wt 117.2 kg (258 lb 6 oz)   SpO2 99%   BMI 37.07 kg/m    Body mass index is 37.07 kg/m .  Physical Exam   SKIN:  3-4 mm area of induration on the left lower flank,  Small brown speck that flakes off does not appear to be tick parts.        Encounter Diagnosis   Name Primary?     Insect bite of left front wall of thorax, initial encounter, unlikely residual tick parts, possible tick bite of unknown duration, check Lyme status. Yes        PLAN:   Lyme screen

## 2023-08-10 NOTE — LETTER
August 11, 2023      Carl Iverson  [address] [facility] [address]        Dear ,  We are writing to inform you of your test results.    Mohan Harris:  Your Lyme screen is NEGATIVE.    Resulted Orders   Lyme Disease Total Abs Bld with Reflex to Confirm CLIA   Result Value Ref Range    Lyme Disease Antibodies Total 0.14 <0.90      Comment:      Non-reactive, Absence of detectable Borrelia burgdorferi antibodies. A non-reactive result does not exclude the possibility of Borrelia burgdorferi infection. If early Lyme disease is suspected, a second sample should be collected and tested 2 to 4 weeks later.       If you have any questions or concerns, please call the clinic at the number listed above.       Sincerely,      Honorio Knowles MD

## 2023-08-11 LAB — B BURGDOR IGG+IGM SER QL: 0.14

## 2023-09-05 ENCOUNTER — OFFICE VISIT (OUTPATIENT)
Dept: FAMILY MEDICINE | Facility: CLINIC | Age: 63
End: 2023-09-05
Payer: COMMERCIAL

## 2023-09-05 VITALS
WEIGHT: 259 LBS | HEART RATE: 61 BPM | TEMPERATURE: 98.1 F | SYSTOLIC BLOOD PRESSURE: 121 MMHG | DIASTOLIC BLOOD PRESSURE: 83 MMHG | HEIGHT: 70 IN | OXYGEN SATURATION: 99 % | BODY MASS INDEX: 37.08 KG/M2 | RESPIRATION RATE: 16 BRPM

## 2023-09-05 DIAGNOSIS — E78.5 HYPERLIPIDEMIA, UNSPECIFIED HYPERLIPIDEMIA TYPE: ICD-10-CM

## 2023-09-05 DIAGNOSIS — M25.512 CHRONIC LEFT SHOULDER PAIN: ICD-10-CM

## 2023-09-05 DIAGNOSIS — I10 ESSENTIAL HYPERTENSION: ICD-10-CM

## 2023-09-05 DIAGNOSIS — Z01.818 PRE-OP EXAM: Primary | ICD-10-CM

## 2023-09-05 DIAGNOSIS — H25.9 SENILE CATARACT OF LEFT EYE, UNSPECIFIED AGE-RELATED CATARACT TYPE: ICD-10-CM

## 2023-09-05 DIAGNOSIS — G89.29 CHRONIC LEFT SHOULDER PAIN: ICD-10-CM

## 2023-09-05 PROCEDURE — 99214 OFFICE O/P EST MOD 30 MIN: CPT | Performed by: FAMILY MEDICINE

## 2023-09-05 NOTE — PROGRESS NOTES
[facility]  [address] [facility] [address]  Phone: [phone]  Fax: [phone]  Primary Provider: Jair Marcano  Pre-op Performing Provider: JAIR MARCANO      PREOPERATIVE EVALUATION:  Today's date: 9/5/2023    Carl Iverson is a 63 year old male who presents for a preoperative evaluation.      9/5/2023     8:04 AM   Additional Questions   Roomed by Keyona RICHARDSON CMA       Surgical Information:  Surgery/Procedure: Phaco IOL- Left eye, yag capsulotomy - Right eye   Surgery Location: [facility]  Surgeon:  Dr Seo   Surgery Date: Left eye 9/13/23, Right eye 9/20/23   Time of Surgery:    Where patient plans to recover: At home with family  Fax number for surgical facility: [phone]    Assessment & Plan     The proposed surgical procedure is considered LOW risk.    Pre-op exam      Senile cataract of left eye, unspecified age-related cataract type      Hypertension      Hyperlipidemia, unspecified hyperlipidemia type          - No identified additional risk factors other than previously addressed    Antiplatelet or Anticoagulation Medication Instructions:   - Patient is on no antiplatelet or anticoagulation medications.   - Bleeding risk is low for this procedure (e.g. dental, skin, cataract).        RECOMMENDATION:  APPROVAL GIVEN to proceed with proposed procedure, without further diagnostic evaluation.        [id number]}    Subjective       HPI related to upcoming procedure: left eye cataract surgery and right eye follow up on previous cataract surgery with laser.        9/4/2023     2:23 PM   Preop Questions   1. Have you ever had a heart attack or stroke? No   2. Have you ever had surgery on your heart or blood vessels, such as a stent placement, a coronary artery bypass, or surgery on an artery in your head, neck, heart, or legs? No   3. Do you have chest pain with activity? No   4. Do you have a history of  heart failure? No   5. Do you currently have  a cold, bronchitis or symptoms of other infection? No   6. Do you have a cough, shortness of breath, or wheezing? No   7. Do you or anyone in your family have previous history of blood clots? No   8. Do you or does anyone in your family have a serious bleeding problem such as prolonged bleeding following surgeries or cuts? No   9. Have you ever had problems with anemia or been told to take iron pills? No   10. Have you had any abnormal blood loss such as black, tarry or bloody stools? No   11. Have you ever had a blood transfusion? No   12. Are you willing to have a blood transfusion if it is medically needed before, during, or after your surgery? Yes   13. Have you or any of your relatives ever had problems with anesthesia? No   14. Do you have sleep apnea, excessive snoring or daytime drowsiness? No   15. Do you have any artifical heart valves or other implanted medical devices like a pacemaker, defibrillator, or continuous glucose monitor? No   16. Do you have artificial joints? No   17. Are you allergic to latex? No       Health Care Directive:  Patient does not have a Health Care Directive or Living Will    Preoperative Review of :   reviewed - no record of controlled substances prescribed.          Review of Systems  CONSTITUTIONAL: NEGATIVE for fever, chills, change in weight  INTEGUMENTARY/SKIN: NEGATIVE for worrisome rashes, moles or lesions  EYES: NEGATIVE for vision changes or irritation  ENT/MOUTH: NEGATIVE for ear, mouth and throat problems  RESP: NEGATIVE for significant cough or SOB  CV: NEGATIVE for chest pain, palpitations or peripheral edema  GI: NEGATIVE for nausea, abdominal pain, heartburn, or change in bowel habits  : NEGATIVE for frequency, dysuria, or hematuria  MUSCULOSKELETAL: NEGATIVE for significant arthralgias or myalgia  NEURO: NEGATIVE for weakness, dizziness or paresthesias  ENDOCRINE: NEGATIVE for temperature intolerance, skin/hair changes  HEME: NEGATIVE for bleeding  problems  PSYCHIATRIC: NEGATIVE for changes in mood or affect    Change in vision    Patient Active Problem List    Diagnosis Date Noted    Obesity (BMI 35.0-39.9) with comorbidity (H) 11/17/2020     Priority: Medium    CAD (coronary artery disease) 07/13/2018     Priority: Medium    Obesity      Priority: Medium     Created by Conversion        Hyperlipidemia      Priority: Medium     Created by Conversion        Benign neoplasm of transverse colon 11/29/2016     Priority: Medium    Adenomatous colon polyp 08/24/2016     Priority: Medium    Hypertension      Priority: Medium     Created by Conversion  Replacement Utility updated for latest IMO load        History of gastrointestinal stromal tumor (GIST) 03/12/2010     Priority: Medium     Created by Conversion          Past Medical History:   Diagnosis Date    Cancer (H)     Skin & Abdominal    Hyperlipidemia     Hyperlipidemia     Hypertension      Past Surgical History:   Procedure Laterality Date    ABDOMEN SURGERY  2010    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    CATARACT EXTRACTION Right     EXCISE GANGLION WRIST Right 10/7/2016    Procedure: RIGHT FOOT MASS RESECTION;  Surgeon: Meet Dickson DPM;  Location: [facility];  Service:     EYE SURGERY      TUMOR REMOVAL      from neck non-cancerous)     Current Outpatient Medications   Medication Sig Dispense Refill    aspirin 81 MG EC tablet [ASPIRIN 81 MG EC TABLET] Take 81 mg by mouth daily.      cholecalciferol, vitamin D3, 5,000 unit Tab [CHOLECALCIFEROL, VITAMIN D3, 5,000 UNIT TAB] Take 1 tablet by mouth daily.             coenzyme Q10 (CO Q-10) 100 mg capsule [COENZYME Q10 (CO Q-10) 100 MG CAPSULE] Take 300 mg by mouth daily.      lisinopril-hydrochlorothiazide (ZESTORETIC) 20-25 MG tablet Take 1 tablet by mouth once daily 90 tablet 3    multivit-minerals/ferrous fum (MULTI VITAMIN ORAL) [MULTIVIT-MINERALS/FERROUS FUM (MULTI VITAMIN ORAL)] Take by mouth daily.      rosuvastatin (CRESTOR) 20 MG  "tablet TAKE 1 TABLET BY MOUTH IN THE MORNING 90 tablet 3       No Known Allergies     Social History     Tobacco Use    Smoking status: Never    Smokeless tobacco: Never   Substance Use Topics    Alcohol use: Yes     Alcohol/week: 10.0 standard drinks of alcohol       History   Drug Use No         Objective     /83   Pulse 61   Temp 98.1  F (36.7  C) (Oral)   Resp 16   Ht 1.778 m (5' 10\")   Wt 117.5 kg (259 lb)   SpO2 99%   BMI 37.16 kg/m      Physical Exam    GENERAL APPEARANCE: healthy, alert and no distress     EYES: EOMI,  PERRL     HENT: ear canals and TM's normal and nose and mouth without ulcers or lesions     NECK: no adenopathy, no asymmetry, masses, or scars and thyroid normal to palpation     RESP: lungs clear to auscultation - no rales, rhonchi or wheezes     CV: regular rates and rhythm, normal S1 S2, no S3 or S4 and no murmur, click or rub     ABDOMEN:  soft, nontender, no HSM or masses and bowel sounds normal     MS: extremities normal- no gross deformities noted, no evidence of inflammation in joints, FROM in all extremities.     SKIN: no suspicious lesions or rashes     NEURO: Normal strength and tone, sensory exam grossly normal, mentation intact and speech normal     PSYCH: mentation appears normal. and affect normal/bright     LYMPHATICS: No cervical adenopathy    Recent Labs   Lab Test 12/21/22  1046 11/19/21  0936   HGB 15.9 16.0    187    140   POTASSIUM 4.1  --    CR 0.89 0.86   A1C  --  5.1        Diagnostics:  No labs were ordered during this visit.   No EKG required for low risk surgery (cataract, skin procedure, breast biopsy, etc).    Revised Cardiac Risk Index (RCRI):  The patient has the following serious cardiovascular risks for perioperative complications:   - No serious cardiac risks = 0 points     RCRI Interpretation: 0 points: Class I (very low risk - 0.4% complication rate)         Signed Electronically by: Honorio Knowles MD  Copy of this evaluation " report is provided to requesting physician.

## 2023-09-14 ENCOUNTER — TRANSFERRED RECORDS (OUTPATIENT)
Dept: HEALTH INFORMATION MANAGEMENT | Facility: CLINIC | Age: 63
End: 2023-09-14
Payer: COMMERCIAL

## 2023-09-18 ENCOUNTER — TRANSFERRED RECORDS (OUTPATIENT)
Dept: HEALTH INFORMATION MANAGEMENT | Facility: CLINIC | Age: 63
End: 2023-09-18
Payer: COMMERCIAL

## 2023-10-30 ASSESSMENT — ENCOUNTER SYMPTOMS
DIARRHEA: 0
SHORTNESS OF BREATH: 0
HEARTBURN: 0
MYALGIAS: 0
HEMATURIA: 0
FEVER: 0
NERVOUS/ANXIOUS: 0
COUGH: 0
ARTHRALGIAS: 0
EYE PAIN: 0
DYSURIA: 0
SORE THROAT: 0
HEADACHES: 0
ABDOMINAL PAIN: 0
PALPITATIONS: 0
CONSTIPATION: 0
NAUSEA: 0
WEAKNESS: 0
DIZZINESS: 0
PARESTHESIAS: 0
CHILLS: 0
JOINT SWELLING: 0
HEMATOCHEZIA: 0
FREQUENCY: 0

## 2023-10-31 ENCOUNTER — OFFICE VISIT (OUTPATIENT)
Dept: FAMILY MEDICINE | Facility: CLINIC | Age: 63
End: 2023-10-31
Payer: COMMERCIAL

## 2023-10-31 VITALS
TEMPERATURE: 97.9 F | OXYGEN SATURATION: 98 % | HEIGHT: 70 IN | HEART RATE: 64 BPM | DIASTOLIC BLOOD PRESSURE: 82 MMHG | BODY MASS INDEX: 36.51 KG/M2 | SYSTOLIC BLOOD PRESSURE: 125 MMHG | WEIGHT: 255 LBS

## 2023-10-31 DIAGNOSIS — D12.6 ADENOMATOUS POLYP OF COLON, UNSPECIFIED PART OF COLON: ICD-10-CM

## 2023-10-31 DIAGNOSIS — E66.09 CLASS 2 OBESITY DUE TO EXCESS CALORIES WITHOUT SERIOUS COMORBIDITY WITH BODY MASS INDEX (BMI) OF 36.0 TO 36.9 IN ADULT: ICD-10-CM

## 2023-10-31 DIAGNOSIS — Z12.5 SCREENING PSA (PROSTATE SPECIFIC ANTIGEN): ICD-10-CM

## 2023-10-31 DIAGNOSIS — E78.5 HYPERLIPIDEMIA, UNSPECIFIED HYPERLIPIDEMIA TYPE: ICD-10-CM

## 2023-10-31 DIAGNOSIS — I10 ESSENTIAL HYPERTENSION: ICD-10-CM

## 2023-10-31 DIAGNOSIS — Z00.00 HEALTHCARE MAINTENANCE: Primary | ICD-10-CM

## 2023-10-31 DIAGNOSIS — Z23 NEED FOR INFLUENZA VACCINATION: ICD-10-CM

## 2023-10-31 DIAGNOSIS — E66.812 CLASS 2 OBESITY DUE TO EXCESS CALORIES WITHOUT SERIOUS COMORBIDITY WITH BODY MASS INDEX (BMI) OF 36.0 TO 36.9 IN ADULT: ICD-10-CM

## 2023-10-31 LAB
ALBUMIN SERPL BCG-MCNC: 4.5 G/DL (ref 3.5–5.2)
ALP SERPL-CCNC: 60 U/L (ref 40–129)
ALT SERPL W P-5'-P-CCNC: 36 U/L (ref 0–70)
ANION GAP SERPL CALCULATED.3IONS-SCNC: 10 MMOL/L (ref 7–15)
AST SERPL W P-5'-P-CCNC: 27 U/L (ref 0–45)
BILIRUB SERPL-MCNC: 0.7 MG/DL
BUN SERPL-MCNC: 19.7 MG/DL (ref 8–23)
CALCIUM SERPL-MCNC: 9.9 MG/DL (ref 8.8–10.2)
CHLORIDE SERPL-SCNC: 101 MMOL/L (ref 98–107)
CHOLEST SERPL-MCNC: 161 MG/DL
CREAT SERPL-MCNC: 0.96 MG/DL (ref 0.67–1.17)
DEPRECATED HCO3 PLAS-SCNC: 28 MMOL/L (ref 22–29)
EGFRCR SERPLBLD CKD-EPI 2021: 89 ML/MIN/1.73M2
ERYTHROCYTE [DISTWIDTH] IN BLOOD BY AUTOMATED COUNT: 12.7 % (ref 10–15)
GLUCOSE SERPL-MCNC: 104 MG/DL (ref 70–99)
HCT VFR BLD AUTO: 47.1 % (ref 40–53)
HDLC SERPL-MCNC: 50 MG/DL
HGB BLD-MCNC: 16.5 G/DL (ref 13.3–17.7)
LDLC SERPL CALC-MCNC: 89 MG/DL
MCH RBC QN AUTO: 31.4 PG (ref 26.5–33)
MCHC RBC AUTO-ENTMCNC: 35 G/DL (ref 31.5–36.5)
MCV RBC AUTO: 90 FL (ref 78–100)
NONHDLC SERPL-MCNC: 111 MG/DL
PLATELET # BLD AUTO: 189 10E3/UL (ref 150–450)
POTASSIUM SERPL-SCNC: 3.8 MMOL/L (ref 3.4–5.3)
PROT SERPL-MCNC: 7.2 G/DL (ref 6.4–8.3)
PSA SERPL DL<=0.01 NG/ML-MCNC: 0.88 NG/ML (ref 0–4.5)
RBC # BLD AUTO: 5.25 10E6/UL (ref 4.4–5.9)
SODIUM SERPL-SCNC: 139 MMOL/L (ref 135–145)
TRIGL SERPL-MCNC: 112 MG/DL
WBC # BLD AUTO: 5.4 10E3/UL (ref 4–11)

## 2023-10-31 PROCEDURE — 99214 OFFICE O/P EST MOD 30 MIN: CPT | Mod: 25 | Performed by: FAMILY MEDICINE

## 2023-10-31 PROCEDURE — 36415 COLL VENOUS BLD VENIPUNCTURE: CPT | Performed by: FAMILY MEDICINE

## 2023-10-31 PROCEDURE — 80053 COMPREHEN METABOLIC PANEL: CPT | Performed by: FAMILY MEDICINE

## 2023-10-31 PROCEDURE — G0103 PSA SCREENING: HCPCS | Performed by: FAMILY MEDICINE

## 2023-10-31 PROCEDURE — 90471 IMMUNIZATION ADMIN: CPT | Performed by: FAMILY MEDICINE

## 2023-10-31 PROCEDURE — 80061 LIPID PANEL: CPT | Performed by: FAMILY MEDICINE

## 2023-10-31 PROCEDURE — 85027 COMPLETE CBC AUTOMATED: CPT | Performed by: FAMILY MEDICINE

## 2023-10-31 PROCEDURE — 90682 RIV4 VACC RECOMBINANT DNA IM: CPT | Performed by: FAMILY MEDICINE

## 2023-10-31 PROCEDURE — 99396 PREV VISIT EST AGE 40-64: CPT | Mod: 25 | Performed by: FAMILY MEDICINE

## 2023-10-31 RX ORDER — OMEGA-3/DHA/EPA/FISH OIL 60 MG-90MG
CAPSULE ORAL
COMMUNITY

## 2023-10-31 ASSESSMENT — ENCOUNTER SYMPTOMS
PARESTHESIAS: 0
NERVOUS/ANXIOUS: 0
CONSTIPATION: 0
HEADACHES: 0
SORE THROAT: 0
MYALGIAS: 0
WEAKNESS: 0
DIZZINESS: 0
PALPITATIONS: 0
CHILLS: 0
DIARRHEA: 0
ABDOMINAL PAIN: 0
FEVER: 0
FREQUENCY: 0
SHORTNESS OF BREATH: 0
COUGH: 0
ARTHRALGIAS: 0
HEARTBURN: 0
JOINT SWELLING: 0
EYE PAIN: 0
HEMATOCHEZIA: 0
NAUSEA: 0
HEMATURIA: 0
DYSURIA: 0

## 2023-10-31 NOTE — PROGRESS NOTES
SUBJECTIVE:   CC: Carl is an 63 year old who presents for preventative health visit.       10/31/2023     7:15 AM   Additional Questions   Roomed by DEBRA Carlson CMA   Accompanied by -       Healthy Habits:     Getting at least 3 servings of Calcium per day:  Yes    Bi-annual eye exam:  Yes    Dental care twice a year:  Yes    Sleep apnea or symptoms of sleep apnea:  None    Diet:  Regular (no restrictions)    Frequency of exercise:  4-5 days/week    Duration of exercise:  30-45 minutes    Taking medications regularly:  Yes    Medication side effects:  None    Additional concerns today:  No            Social History     Tobacco Use    Smoking status: Never    Smokeless tobacco: Never   Substance Use Topics    Alcohol use: Yes     Alcohol/week: 10.0 standard drinks of alcohol   Not exceeding 2 in a day          10/30/2023     7:41 AM   Alcohol Use   Prescreen: >3 drinks/day or >7 drinks/week? No          No data to display                Last PSA:   Prostate Specific Antigen Screen   Date Value Ref Range Status   12/21/2022 0.68 0.00 - 4.50 ng/mL Final   11/19/2021 0.65 0.00 - 4.50 ug/L Final       Reviewed orders with patient. Reviewed health maintenance and updated orders accordingly - Yes      Reviewed and updated as needed this visit by clinical staff   Tobacco  Allergies  Meds              Reviewed and updated as needed this visit by Provider                     Review of Systems   Constitutional:  Negative for chills and fever.   HENT:  Negative for congestion, ear pain, hearing loss and sore throat.    Eyes:  Negative for pain and visual disturbance.   Respiratory:  Negative for cough and shortness of breath.    Cardiovascular:  Negative for chest pain, palpitations and peripheral edema.   Gastrointestinal:  Negative for abdominal pain, constipation, diarrhea, heartburn, hematochezia and nausea.   Genitourinary:  Negative for dysuria, frequency, genital sores, hematuria, impotence, penile discharge and  "urgency.   Musculoskeletal:  Negative for arthralgias, joint swelling and myalgias.   Skin:  Negative for rash.   Neurological:  Negative for dizziness, weakness, headaches and paresthesias.   Psychiatric/Behavioral:  Negative for mood changes. The patient is not nervous/anxious.          OBJECTIVE:   /82   Pulse 64   Temp 97.9  F (36.6  C) (Oral)   Ht 1.778 m (5' 10\")   Wt 115.7 kg (255 lb)   SpO2 98%   BMI 36.59 kg/m        Wt Readings from Last 4 Encounters:   10/31/23 115.7 kg (255 lb)   09/05/23 117.5 kg (259 lb)   08/10/23 117.2 kg (258 lb 6 oz)   12/21/22 115.8 kg (255 lb 3.2 oz)      Physical Exam  GENERAL: healthy, alert and no distress  EYES: Eyes grossly normal to inspection, PERRL and conjunctivae and sclerae normal  HENT: ear canals and TM's normal, nose and mouth without ulcers or lesions  NECK: no adenopathy, no asymmetry, masses, or scars and thyroid normal to palpation  RESP: lungs clear to auscultation - no rales, rhonchi or wheezes  CV: regular rate and rhythm, normal S1 S2, no S3 or S4, no murmur, click or rub, no peripheral edema and peripheral pulses strong  ABDOMEN: soft, nontender, no hepatosplenomegaly, no masses and bowel sounds normal   (male): INÉS  declined.  Will do a yearly PSA  MS: no gross musculoskeletal defects noted, no edema  SKIN: no suspicious lesions or rashes  NEURO: Normal strength and tone, mentation intact and speech normal  PSYCH: mentation appears normal, affect normal/bright    Prostate Specific Antigen Screen   Date Value Ref Range Status   12/21/2022 0.68 0.00 - 4.50 ng/mL Final   11/19/2021 0.65 0.00 - 4.50 ug/L Final     Lab Results   Component Value Date    A1C 5.1 11/19/2021     Last Comprehensive Metabolic Panel:  Sodium   Date Value Ref Range Status   12/21/2022 140 136 - 145 mmol/L Final     Potassium   Date Value Ref Range Status   12/21/2022 4.1 3.4 - 5.3 mmol/L Final   11/19/2021   Final     Comment:     Specimen hemolyzed, result invalid " "    Chloride   Date Value Ref Range Status   12/21/2022 100 98 - 107 mmol/L Final   11/19/2021 104 98 - 107 mmol/L Final     Carbon Dioxide (CO2)   Date Value Ref Range Status   12/21/2022 26 22 - 29 mmol/L Final   11/19/2021 24 22 - 31 mmol/L Final     Anion Gap   Date Value Ref Range Status   12/21/2022 14 7 - 15 mmol/L Final   11/19/2021 12 5 - 18 mmol/L Final     Glucose   Date Value Ref Range Status   12/21/2022 99 70 - 99 mg/dL Final   11/19/2021 95 70 - 125 mg/dL Final     Urea Nitrogen   Date Value Ref Range Status   12/21/2022 12.2 8.0 - 23.0 mg/dL Final   11/19/2021 14 8 - 22 mg/dL Final     Creatinine   Date Value Ref Range Status   12/21/2022 0.89 0.67 - 1.17 mg/dL Final     GFR Estimate   Date Value Ref Range Status   12/21/2022 >90 >60 mL/min/1.73m2 Final     Comment:     Effective December 21, 2021 eGFRcr in adults is calculated using the 2021 CKD-EPI creatinine equation which includes age and gender (Juan Diego et al., NEJ, DOI: 10.1056/NHXOni9843571)   11/17/2020 >60 >60 mL/min/1.73m2 Final     Calcium   Date Value Ref Range Status   12/21/2022 9.8 8.8 - 10.2 mg/dL Final     Bilirubin Total   Date Value Ref Range Status   12/21/2022 0.6 <=1.2 mg/dL Final     Alkaline Phosphatase   Date Value Ref Range Status   12/21/2022 57 40 - 129 U/L Final     ALT   Date Value Ref Range Status   12/21/2022 28 10 - 50 U/L Final     AST   Date Value Ref Range Status   12/21/2022 26 10 - 50 U/L Final       CBC RESULTS:   Recent Labs   Lab Test 12/21/22  1046   WBC 4.2   RBC 5.10   HGB 15.9   HCT 45.6   MCV 89   MCH 31.2   MCHC 34.9   RDW 12.5         Lab Results   Component Value Date    CHOL 157 12/21/2022     Lab Results   Component Value Date    HDL 44 12/21/2022     Lab Results   Component Value Date    LDL 93 12/21/2022    LDL 86 02/05/2010     Lab Results   Component Value Date    TRIG 99 12/21/2022     No results found for: \"CHOLHDLRATIO\"           ASSESSMENT/PLAN:       ICD-10-CM    1. Healthcare " "maintenance  Z00.00 CBC with platelets      2. Hypertension, controlled I10       3. Hyperlipidemia, unspecified hyperlipidemia type, stable on statin E78.5 Lipid Profile (Chol, Trig, HDL, LDL calc)     Comprehensive metabolic panel (BMP + Alb, Alk Phos, ALT, AST, Total. Bili, TP)      4. Adenomatous polyp of colon, unspecified part of colon  D12.6       5. Screening PSA (prostate specific antigen)  Z12.5 PSA, screen      6. Need for influenza vaccination  Z23 INFLUENZA VACCINE 18-64Y (FLUBLOK)          PLAN;        Next Colonoscopy due in Jan 2027    Fasting labs    Flu vaccine today    Covid vaccine in the future    Consider an increase in rosuvastatin based on today's lipid panel    Work on weight reduction of 20-25 lbs    Yearly skin exam at [facility]     Patient has been advised of split billing requirements and indicates understanding: Yes      COUNSELING:   Reviewed preventive health counseling, as reflected in patient instructions       Regular exercise       Healthy diet/nutrition       Colorectal cancer screening       Prostate cancer screening      BMI:   Estimated body mass index is 36.59 kg/m  as calculated from the following:    Height as of this encounter: 1.778 m (5' 10\").    Weight as of this encounter: 115.7 kg (255 lb).   Weight management plan: Discussed healthy diet and exercise guidelines      He reports that he has never smoked. He has never used smokeless tobacco.        Honorio Knowles MD  [facility]  "

## 2023-10-31 NOTE — PATIENT INSTRUCTIONS
Next Colonoscopy due in Jan 2027    Fasting labs    Flu vaccine today    Covid vaccine in the future    Consider an increase in rosuvastatin based on today's lipid panel.    Work on weight reduction of 20-25 lbs    Yearly skin exam at [facility]

## 2023-10-31 NOTE — LETTER
October 31, 2023      Carl Iverson  [address] [address] [facility] [address]        Dear ,  We are writing to inform you of your test results.    Mohan Harris:  Your cholesterol is good but the LDL goal would be <70 with h/o coronary artery disease.  As we discussed the next step up in rosuvastatin would be to 40 mg daily.  Let me know if you would like to do that and I will make the change.    The PSA is normal and stable,  just slightly up from last year.  Recheck the PSA in one year.  The blood sugar is upper normal.  Continue to work on weight reduction through a low carb diet combined with exercise to help prevent the development of diabetes.  The remaining labs are normal.    Resulted Orders   Lipid Profile (Chol, Trig, HDL, LDL calc)   Result Value Ref Range    Cholesterol 161 <200 mg/dL    Triglycerides 112 <150 mg/dL    Direct Measure HDL 50 >=40 mg/dL    LDL Cholesterol Calculated 89 <=100 mg/dL    Non HDL Cholesterol 111 <130 mg/dL    Narrative    Cholesterol  Desirable:  <200 mg/dL    Triglycerides  Normal:  Less than 150 mg/dL  Borderline High:  150-199 mg/dL  High:  200-499 mg/dL  Very High:  Greater than or equal to 500 mg/dL    Direct Measure HDL  Female:  Greater than or equal to 50 mg/dL   Male:  Greater than or equal to 40 mg/dL    LDL Cholesterol  Desirable:  <100mg/dL  Above Desirable:  100-129 mg/dL   Borderline High:  130-159 mg/dL   High:  160-189 mg/dL   Very High:  >= 190 mg/dL    Non HDL Cholesterol  Desirable:  130 mg/dL  Above Desirable:  130-159 mg/dL  Borderline High:  160-189 mg/dL  High:  190-219 mg/dL  Very High:  Greater than or equal to 220 mg/dL   Comprehensive metabolic panel (BMP + Alb, Alk Phos, ALT, AST, Total. Bili, TP)   Result Value Ref Range    Sodium 139 135 - 145 mmol/L      Comment:      Reference intervals for this test were updated on 09/26/2023 to more accurately reflect our healthy population. There may be differences in the flagging of prior results with similar  values performed with this method. Interpretation of those prior results can be made in the context of the updated reference intervals.     Potassium 3.8 3.4 - 5.3 mmol/L    Carbon Dioxide (CO2) 28 22 - 29 mmol/L    Anion Gap 10 7 - 15 mmol/L    Urea Nitrogen 19.7 8.0 - 23.0 mg/dL    Creatinine 0.96 0.67 - 1.17 mg/dL    GFR Estimate 89 >60 mL/min/1.73m2    Calcium 9.9 8.8 - 10.2 mg/dL    Chloride 101 98 - 107 mmol/L    Glucose 104 (H) 70 - 99 mg/dL    Alkaline Phosphatase 60 40 - 129 U/L    AST 27 0 - 45 U/L      Comment:      Reference intervals for this test were updated on 6/12/2023 to more accurately reflect our healthy population. There may be differences in the flagging of prior results with similar values performed with this method. Interpretation of those prior results can be made in the context of the updated reference intervals.    ALT 36 0 - 70 U/L      Comment:      Reference intervals for this test were updated on 6/12/2023 to more accurately reflect our healthy population. There may be differences in the flagging of prior results with similar values performed with this method. Interpretation of those prior results can be made in the context of the updated reference intervals.      Protein Total 7.2 6.4 - 8.3 g/dL    Albumin 4.5 3.5 - 5.2 g/dL    Bilirubin Total 0.7 <=1.2 mg/dL   CBC with platelets   Result Value Ref Range    WBC Count 5.4 4.0 - 11.0 10e3/uL    RBC Count 5.25 4.40 - 5.90 10e6/uL    Hemoglobin 16.5 13.3 - 17.7 g/dL    Hematocrit 47.1 40.0 - 53.0 %    MCV 90 78 - 100 fL    MCH 31.4 26.5 - 33.0 pg    MCHC 35.0 31.5 - 36.5 g/dL    RDW 12.7 10.0 - 15.0 %    Platelet Count 189 150 - 450 10e3/uL   PSA, screen   Result Value Ref Range    Prostate Specific Antigen Screen 0.88 0.00 - 4.50 ng/mL    Narrative    This result is obtained using the Roche Elecsys total PSA method on the leonardo e801 immunoassay analyzer. Results obtained with different assay methods or kits cannot be used  interchangeably.       If you have any questions or concerns, please call the clinic at the number listed above.       Sincerely,      Honorio Knowles MD

## 2023-12-11 ENCOUNTER — PATIENT OUTREACH (OUTPATIENT)
Dept: GASTROENTEROLOGY | Facility: CLINIC | Age: 63
End: 2023-12-11
Payer: COMMERCIAL

## 2023-12-23 DIAGNOSIS — I10 BENIGN ESSENTIAL HYPERTENSION: ICD-10-CM

## 2023-12-23 DIAGNOSIS — E78.5 HYPERLIPIDEMIA LDL GOAL <70: ICD-10-CM

## 2023-12-26 NOTE — TELEPHONE ENCOUNTER
Patient called in, inquiring about the status of his refill request. States he needs refill for his lisinopril and rosuvastatin. Please advise

## 2023-12-28 RX ORDER — ROSUVASTATIN CALCIUM 20 MG/1
TABLET, COATED ORAL
Qty: 90 TABLET | Refills: 2 | Status: SHIPPED | OUTPATIENT
Start: 2023-12-28

## 2023-12-28 RX ORDER — LISINOPRIL AND HYDROCHLOROTHIAZIDE 20; 25 MG/1; MG/1
TABLET ORAL
Qty: 90 TABLET | Refills: 2 | Status: SHIPPED | OUTPATIENT
Start: 2023-12-28

## 2024-05-06 ENCOUNTER — TRANSFERRED RECORDS (OUTPATIENT)
Dept: HEALTH INFORMATION MANAGEMENT | Facility: CLINIC | Age: 64
End: 2024-05-06
Payer: COMMERCIAL

## 2024-10-10 DIAGNOSIS — I10 BENIGN ESSENTIAL HYPERTENSION: ICD-10-CM

## 2024-10-10 DIAGNOSIS — E78.5 HYPERLIPIDEMIA LDL GOAL <70: ICD-10-CM

## 2024-10-10 RX ORDER — ROSUVASTATIN CALCIUM 20 MG/1
TABLET, COATED ORAL
Qty: 90 TABLET | Refills: 0 | Status: SHIPPED | OUTPATIENT
Start: 2024-10-10

## 2024-10-10 RX ORDER — LISINOPRIL AND HYDROCHLOROTHIAZIDE 20; 25 MG/1; MG/1
1 TABLET ORAL DAILY
Qty: 90 TABLET | Refills: 0 | Status: SHIPPED | OUTPATIENT
Start: 2024-10-10 | End: 2024-11-04

## 2024-10-10 NOTE — TELEPHONE ENCOUNTER
Patient has 4 tablets for both medications. He's scheduled with Dr. Palomo at [place] on 11/04/24.

## 2024-11-03 SDOH — HEALTH STABILITY: PHYSICAL HEALTH: ON AVERAGE, HOW MANY MINUTES DO YOU ENGAGE IN EXERCISE AT THIS LEVEL?: 30 MIN

## 2024-11-03 SDOH — HEALTH STABILITY: PHYSICAL HEALTH: ON AVERAGE, HOW MANY DAYS PER WEEK DO YOU ENGAGE IN MODERATE TO STRENUOUS EXERCISE (LIKE A BRISK WALK)?: 5 DAYS

## 2024-11-03 ASSESSMENT — SOCIAL DETERMINANTS OF HEALTH (SDOH): HOW OFTEN DO YOU GET TOGETHER WITH FRIENDS OR RELATIVES?: TWICE A WEEK

## 2024-11-04 ENCOUNTER — OFFICE VISIT (OUTPATIENT)
Dept: FAMILY MEDICINE | Facility: CLINIC | Age: 64
End: 2024-11-04
Payer: COMMERCIAL

## 2024-11-04 VITALS
RESPIRATION RATE: 20 BRPM | SYSTOLIC BLOOD PRESSURE: 128 MMHG | BODY MASS INDEX: 37 KG/M2 | DIASTOLIC BLOOD PRESSURE: 76 MMHG | HEIGHT: 70 IN | HEART RATE: 80 BPM | WEIGHT: 258.45 LBS | TEMPERATURE: 97.2 F | OXYGEN SATURATION: 97 %

## 2024-11-04 DIAGNOSIS — I25.10 CORONARY ARTERY DISEASE INVOLVING NATIVE CORONARY ARTERY OF NATIVE HEART WITHOUT ANGINA PECTORIS: ICD-10-CM

## 2024-11-04 DIAGNOSIS — Z12.5 SCREENING PSA (PROSTATE SPECIFIC ANTIGEN): ICD-10-CM

## 2024-11-04 DIAGNOSIS — E66.01 MORBID OBESITY (H): ICD-10-CM

## 2024-11-04 DIAGNOSIS — I10 HYPERTENSION GOAL BP (BLOOD PRESSURE) < 140/90: ICD-10-CM

## 2024-11-04 DIAGNOSIS — Z23 NEED FOR VACCINATION: ICD-10-CM

## 2024-11-04 DIAGNOSIS — D44.6 CAROTID BODY TUMOR (H): ICD-10-CM

## 2024-11-04 DIAGNOSIS — Z00.00 ROUTINE GENERAL MEDICAL EXAMINATION AT A HEALTH CARE FACILITY: Primary | ICD-10-CM

## 2024-11-04 LAB
ERYTHROCYTE [DISTWIDTH] IN BLOOD BY AUTOMATED COUNT: 12.8 % (ref 10–15)
HCT VFR BLD AUTO: 45.3 % (ref 40–53)
HGB BLD-MCNC: 16 G/DL (ref 13.3–17.7)
MCH RBC QN AUTO: 32.1 PG (ref 26.5–33)
MCHC RBC AUTO-ENTMCNC: 35.3 G/DL (ref 31.5–36.5)
MCV RBC AUTO: 91 FL (ref 78–100)
PLATELET # BLD AUTO: 200 10E3/UL (ref 150–450)
RBC # BLD AUTO: 4.99 10E6/UL (ref 4.4–5.9)
WBC # BLD AUTO: 7.1 10E3/UL (ref 4–11)

## 2024-11-04 PROCEDURE — 80053 COMPREHEN METABOLIC PANEL: CPT | Performed by: FAMILY MEDICINE

## 2024-11-04 PROCEDURE — 80061 LIPID PANEL: CPT | Performed by: FAMILY MEDICINE

## 2024-11-04 PROCEDURE — 90673 RIV3 VACCINE NO PRESERV IM: CPT | Performed by: FAMILY MEDICINE

## 2024-11-04 PROCEDURE — 99214 OFFICE O/P EST MOD 30 MIN: CPT | Mod: 25 | Performed by: FAMILY MEDICINE

## 2024-11-04 PROCEDURE — 90471 IMMUNIZATION ADMIN: CPT | Performed by: FAMILY MEDICINE

## 2024-11-04 PROCEDURE — 99396 PREV VISIT EST AGE 40-64: CPT | Mod: 25 | Performed by: FAMILY MEDICINE

## 2024-11-04 PROCEDURE — 85027 COMPLETE CBC AUTOMATED: CPT | Performed by: FAMILY MEDICINE

## 2024-11-04 PROCEDURE — G0103 PSA SCREENING: HCPCS | Performed by: FAMILY MEDICINE

## 2024-11-04 PROCEDURE — 36415 COLL VENOUS BLD VENIPUNCTURE: CPT | Performed by: FAMILY MEDICINE

## 2024-11-04 PROCEDURE — 84443 ASSAY THYROID STIM HORMONE: CPT | Performed by: FAMILY MEDICINE

## 2024-11-04 RX ORDER — LISINOPRIL AND HYDROCHLOROTHIAZIDE 20; 25 MG/1; MG/1
1 TABLET ORAL DAILY
Qty: 90 TABLET | Refills: 3 | Status: SHIPPED | OUTPATIENT
Start: 2024-11-04

## 2024-11-04 NOTE — PATIENT INSTRUCTIONS
Patient Education   Preventive Care Advice   This is general advice given by our system to help you stay healthy. However, your care team may have specific advice just for you. Please talk to your care team about your preventive care needs.  Nutrition  Eat 5 or more servings of fruits and vegetables each day.  Try wheat bread, brown rice and whole grain pasta (instead of white bread, rice, and pasta).  Get enough calcium and vitamin D. Check the label on foods and aim for 100% of the RDA (recommended daily allowance).  Lifestyle  Exercise at least 150 minutes each week  (30 minutes a day, 5 days a week).  Do muscle strengthening activities 2 days a week. These help control your weight and prevent disease.  No smoking.  Wear sunscreen to prevent skin cancer.  Have a dental exam and cleaning every 6 months.  Yearly exams  See your health care team every year to talk about:  Any changes in your health.  Any medicines your care team has prescribed.  Preventive care, family planning, and ways to prevent chronic diseases.  Shots (vaccines)   HPV shots (up to age 26), if you've never had them before.  Hepatitis B shots (up to age 59), if you've never had them before.  COVID-19 shot: Get this shot when it's due.  Flu shot: Get a flu shot every year.  Tetanus shot: Get a tetanus shot every 10 years.  Pneumococcal, hepatitis A, and RSV shots: Ask your care team if you need these based on your risk.  Shingles shot (for age 50 and up)  General health tests  Diabetes screening:  Starting at age 35, Get screened for diabetes at least every 3 years.  If you are younger than age 35, ask your care team if you should be screened for diabetes.  Cholesterol test: At age 39, start having a cholesterol test every 5 years, or more often if advised.  Bone density scan (DEXA): At age 50, ask your care team if you should have this scan for osteoporosis (brittle bones).  Hepatitis C: Get tested at least once in your life.  STIs (sexually  transmitted infections)  Before age 24: Ask your care team if you should be screened for STIs.  After age 24: Get screened for STIs if you're at risk. You are at risk for STIs (including HIV) if:  You are sexually active with more than one person.  You don't use condoms every time.  You or a partner was diagnosed with a sexually transmitted infection.  If you are at risk for HIV, ask about PrEP medicine to prevent HIV.  Get tested for HIV at least once in your life, whether you are at risk for HIV or not.  Cancer screening tests  Cervical cancer screening: If you have a cervix, begin getting regular cervical cancer screening tests starting at age 21.  Breast cancer scan (mammogram): If you've ever had breasts, begin having regular mammograms starting at age 40. This is a scan to check for breast cancer.  Colon cancer screening: It is important to start screening for colon cancer at age 45.  Have a colonoscopy test every 10 years (or more often if you're at risk) Or, ask your provider about stool tests like a FIT test every year or Cologuard test every 3 years.  To learn more about your testing options, visit:   .  For help making a decision, visit:   https://bit.ly/cq59946.  Prostate cancer screening test: If you have a prostate, ask your care team if a prostate cancer screening test (PSA) at age 55 is right for you.  Lung cancer screening: If you are a current or former smoker ages 50 to 80, ask your care team if ongoing lung cancer screenings are right for you.  For informational purposes only. Not to replace the advice of your health care provider. Copyright   2023 [employer]. All rights reserved. Clinically reviewed by the [facility]. [employer] [id number] - REV 01/24.

## 2024-11-04 NOTE — PROGRESS NOTES
"Preventive Care Visit  [facility] JASON Palomo MD, Family Medicine  Nov 4, 2024      Assessment & Plan     Carl was seen today for physical.    Diagnoses and all orders for this visit:    Routine general medical examination at a health care facility  -     REVIEW OF HEALTH MAINTENANCE PROTOCOL ORDERS  -     PRIMARY CARE FOLLOW-UP SCHEDULING; Future  -     CBC with platelets  -     Comprehensive metabolic panel (BMP + Alb, Alk Phos, ALT, AST, Total. Bili, TP)  -     TSH with free T4 reflex    Hypertension goal BP (blood pressure) < 140/90, controlled  -     Refill: lisinopril-hydrochlorothiazide (ZESTORETIC) 20-25 MG tablet; Take 1 tablet by mouth daily.    Coronary artery disease involving native coronary artery of native heart without angina pectoris, stable   -     Lipid panel reflex to direct LDL Fasting  -     Continue high intensity statin     Carotid body tumor (H) s/p removal        -   stable without any recurrence reported.     Morbid obesity (H)       -   Weight management plan: Discussed healthy diet and exercise guidelines      Need for vaccination  -     INFLUENZA VACCINE TRIVALENT(FLUBLOK)            Patient has been advised of split billing requirements and indicates understanding: Yes        BMI  Estimated body mass index is 36.59 kg/m  as calculated from the following:    Height as of this encounter: 1.79 m (5' 10.47\").    Weight as of this encounter: 117.2 kg (258 lb 7.2 oz).   Weight management plan: Discussed healthy diet and exercise guidelines    Counseling  Appropriate preventive services were addressed with this patient via screening, questionnaire, or discussion as appropriate for fall prevention, nutrition, physical activity, Tobacco-use cessation, social engagement, weight loss and cognition.  Checklist reviewing preventive services available has been given to the patient.  Reviewed patient's diet, addressing concerns and/or questions.       Return in about 1 " year (around 11/4/2025) for Physical Exam and as needed throughout the year.      Subjective   Carl is a 64 year old, presenting for the following:  Physical        11/4/2024    10:07 AM   Additional Questions   Roomed by MP         11/4/2024    10:07 AM   Patient Reported Additional Medications   Patient reports taking the following new medications None per patient          HPI    Carl is a pleasant 64 year old male patient here for his annual Physical exam and chronic disease management.      CAD - Patient has a longstanding history of moderate-severe CAD. Patient denies recent chest pain or NTG use, denies exercise induced dyspnea or PND. Last Stress test 6/3/2019, EKG 10/9/2020. Stable on medication management- statin therapy and ACEI     HYPERTENSION - Patient has longstanding history of HTN , currently denies any symptoms referable to elevated blood pressure. Specifically denies chest pain, palpitations, dyspnea, orthopnea, PND or peripheral edema. Blood pressure readings have been in normal range. Current medication regimen is as listed below. Patient denies any side effects of medication- Lisinopril_HCTZ 20-25 mg/day .     BP Readings from Last 3 Encounters:   11/04/24 128/76   10/31/23 125/82   09/05/23 121/83       History of carotid body tumor s/p removal in 2011. No recurrence since then.     HEALTH CARE MAINTENANCE: Due for labs- fasting today and vaccines- wants only a Flu shot.        Health Care Directive  Patient does not have a Health Care Directive: Discussed advance care planning with patient; information given to patient to review.      11/3/2024   General Health   How would you rate your overall physical health? Good   Feel stress (tense, anxious, or unable to sleep) Not at all            11/3/2024   Nutrition   Three or more servings of calcium each day? Yes   Diet: Regular (no restrictions)   How many servings of fruit and vegetables per day? (!) 2-3   How many sweetened beverages each day?  0-1            11/3/2024   Exercise   Days per week of moderate/strenous exercise 5 days   Average minutes spent exercising at this level 30 min            11/3/2024   Social Factors   Frequency of gathering with friends or relatives Twice a week   Worry food won't last until get money to buy more No   Food not last or not have enough money for food? No   Do you have housing? (Housing is defined as stable permanent housing and does not include staying ouside in a car, in a tent, in an abandoned building, in an overnight shelter, or couch-surfing.) Yes   Are you worried about losing your housing? No   Lack of transportation? No   Unable to get utilities (heat,electricity)? No            11/3/2024   Fall Risk   Fallen 2 or more times in the past year? No    Trouble with walking or balance? No        Patient-reported          11/3/2024   Dental   Dentist two times every year? Yes            11/3/2024   TB Screening   Were you born outside of the [place]? No            Today's PHQ-2 Score:       11/3/2024     8:47 AM   PHQ-2 ( 1999 Pfizer)   Q1: Little interest or pleasure in doing things 0    Q2: Feeling down, depressed or hopeless 0    PHQ-2 Score 0    Q1: Little interest or pleasure in doing things Not at all   Q2: Feeling down, depressed or hopeless Not at all   PHQ-2 Score 0       Patient-reported           11/3/2024   Substance Use   Alcohol more than 3/day or more than 7/wk No   Do you use any other substances recreationally? No        Social History     Tobacco Use    Smoking status: Never     Passive exposure: Never    Smokeless tobacco: Never   Vaping Use    Vaping status: Never Used   Substance Use Topics    Alcohol use: Yes     Alcohol/week: 10.0 standard drinks of alcohol    Drug use: No             11/3/2024   One time HIV Screening   Previous HIV test? I don't know          11/3/2024   STI Screening   New sexual partner(s) since last STI/HIV test? No      Last PSA:   Prostate Specific Antigen Screen   Date  Value Ref Range Status   10/31/2023 0.88 0.00 - 4.50 ng/mL Final   11/19/2021 0.65 0.00 - 4.50 ug/L Final     ASCVD Risk   The 10-year ASCVD risk score (Gibran LUCAS, et al., 2019) is: 11.7%    Values used to calculate the score:      Age: 64 years      Sex: Male      Is Non- : No      Diabetic: No      Tobacco smoker: No      Systolic Blood Pressure: 128 mmHg      Is BP treated: Yes      HDL Cholesterol: 50 mg/dL      Total Cholesterol: 161 mg/dL        Reviewed and updated as needed this visit by Provider      Problems   Surg Hx             Past Medical History:   Diagnosis Date    Cancer (H)     Skin & Abdominal    Hyperlipidemia     Hyperlipidemia     Hypertension      Past Surgical History:   Procedure Laterality Date    ABDOMEN SURGERY  01/01/2010    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    BIOPSY  2009    CATARACT EXTRACTION Right     COLONOSCOPY  January 2022    EXCISE GANGLION WRIST Right 10/07/2016    Procedure: RIGHT FOOT MASS RESECTION;  Surgeon: Meet Dickson DPM;  [place] [facility] [place]  Service:     EYE SURGERY      TUMOR REMOVAL      from neck non-cancerous)     Lab work is in process  Labs reviewed in EPIC  BP Readings from Last 3 Encounters:   11/04/24 128/76   10/31/23 125/82   09/05/23 121/83    Wt Readings from Last 3 Encounters:   11/04/24 117.2 kg (258 lb 7.2 oz)   10/31/23 115.7 kg (255 lb)   09/05/23 117.5 kg (259 lb)                  Patient Active Problem List   Diagnosis    Hyperlipidemia    Obesity    Hypertension    History of gastrointestinal stromal tumor (GIST)    Adenomatous colon polyp    CAD (coronary artery disease)    Benign neoplasm of transverse colon    Obesity (BMI 35.0-39.9) with comorbidity (H)    Carotid body tumor (H)     Past Surgical History:   Procedure Laterality Date    ABDOMEN SURGERY  01/01/2010    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    BIOPSY  2009    CATARACT EXTRACTION Right     COLONOSCOPY  January 2022    EXCISE  GANGLION WRIST Right 10/07/2016    Procedure: RIGHT FOOT MASS RESECTION;  Surgeon: Meet Dickson DPM;  Location: [facility] - [place];  Service:     EYE SURGERY      TUMOR REMOVAL      from neck non-cancerous)       Social History     Tobacco Use    Smoking status: Never     Passive exposure: Never    Smokeless tobacco: Never   Substance Use Topics    Alcohol use: Yes     Alcohol/week: 10.0 standard drinks of alcohol     Family History   Problem Relation Age of Onset    No Known Problems Mother     Cancer Father     Lung Cancer Father     Other Cancer Father         Lung cancer    No Known Problems Sister     No Known Problems Brother     Coronary Artery Disease Maternal Grandfather 75    Heart Disease Maternal Grandfather     No Known Problems Sister     No Known Problems Brother     Colon Cancer Maternal Aunt     Cancer Maternal Aunt         Brain    No Known Problems Son     No Known Problems Daughter     Colon Cancer Other     Cerebrovascular Disease No family hx of          Current Outpatient Medications   Medication Sig Dispense Refill    aspirin 81 MG EC tablet [ASPIRIN 81 MG EC TABLET] Take 81 mg by mouth daily.      cholecalciferol, vitamin D3, 5,000 unit Tab [CHOLECALCIFEROL, VITAMIN D3, 5,000 UNIT TAB] Take 1 tablet by mouth daily.             coenzyme Q10 (CO Q-10) 100 mg capsule [COENZYME Q10 (CO Q-10) 100 MG CAPSULE] Take 300 mg by mouth daily.      fish oil-omega-3 fatty acids 500 MG capsule       lisinopril-hydrochlorothiazide (ZESTORETIC) 20-25 MG tablet Take 1 tablet by mouth daily. 90 tablet 3    multivit-minerals/ferrous fum (MULTI VITAMIN ORAL) [MULTIVIT-MINERALS/FERROUS FUM (MULTI VITAMIN ORAL)] Take by mouth daily.      rosuvastatin (CRESTOR) 20 MG tablet TAKE 1 TABLET BY MOUTH IN THE MORNING 90 tablet 0     No Known Allergies      Review of Systems  Constitutional, HEENT, cardiovascular, pulmonary, gi and gu systems are negative, except as otherwise noted.     Objective    Exam  /76    "Pulse 80   Temp 97.2  F (36.2  C) (Temporal)   Resp 20   Ht 1.79 m (5' 10.47\")   Wt 117.2 kg (258 lb 7.2 oz)   SpO2 97%   BMI 36.59 kg/m     Estimated body mass index is 36.59 kg/m  as calculated from the following:    Height as of this encounter: 1.79 m (5' 10.47\").    Weight as of this encounter: 117.2 kg (258 lb 7.2 oz).    Physical Exam  GENERAL: alert and no distress  EYES: Eyes grossly normal to inspection, PERRL and conjunctivae and sclerae normal  HENT: ear canals and TM's normal, nose and mouth without ulcers or lesions  NECK: no adenopathy, no asymmetry, masses, or scars  RESP: lungs clear to auscultation - no rales, rhonchi or wheezes  CV: regular rate and rhythm, normal S1 S2, no S3 or S4, no murmur, click or rub, no peripheral edema  ABDOMEN: soft, nontender, no hepatosplenomegaly, no masses and bowel sounds normal  MS: no gross musculoskeletal defects noted, no edema  SKIN: no suspicious lesions or rashes  NEURO: Normal strength and tone, mentation intact and speech normal  PSYCH: mentation appears normal, affect normal/bright    DATA  Labs drawn and in process    Signed Electronically by: Danya Palomo MD    "

## 2024-11-05 LAB
ALBUMIN SERPL BCG-MCNC: 4.2 G/DL (ref 3.5–5.2)
ALP SERPL-CCNC: 59 U/L (ref 40–150)
ALT SERPL W P-5'-P-CCNC: 30 U/L (ref 0–70)
ANION GAP SERPL CALCULATED.3IONS-SCNC: 10 MMOL/L (ref 7–15)
AST SERPL W P-5'-P-CCNC: 25 U/L (ref 0–45)
BILIRUB SERPL-MCNC: 0.7 MG/DL
BUN SERPL-MCNC: 14.3 MG/DL (ref 8–23)
CALCIUM SERPL-MCNC: 9.4 MG/DL (ref 8.8–10.4)
CHLORIDE SERPL-SCNC: 101 MMOL/L (ref 98–107)
CHOLEST SERPL-MCNC: 156 MG/DL
CREAT SERPL-MCNC: 0.84 MG/DL (ref 0.67–1.17)
EGFRCR SERPLBLD CKD-EPI 2021: >90 ML/MIN/1.73M2
FASTING STATUS PATIENT QL REPORTED: YES
FASTING STATUS PATIENT QL REPORTED: YES
GLUCOSE SERPL-MCNC: 98 MG/DL (ref 70–99)
HCO3 SERPL-SCNC: 26 MMOL/L (ref 22–29)
HDLC SERPL-MCNC: 56 MG/DL
LDLC SERPL CALC-MCNC: 82 MG/DL
NONHDLC SERPL-MCNC: 100 MG/DL
POTASSIUM SERPL-SCNC: 4 MMOL/L (ref 3.4–5.3)
PROT SERPL-MCNC: 6.6 G/DL (ref 6.4–8.3)
SODIUM SERPL-SCNC: 137 MMOL/L (ref 135–145)
TRIGL SERPL-MCNC: 89 MG/DL
TSH SERPL DL<=0.005 MIU/L-ACNC: 1.73 UIU/ML (ref 0.3–4.2)

## 2024-11-08 DIAGNOSIS — Z12.5 SCREENING PSA (PROSTATE SPECIFIC ANTIGEN): Primary | ICD-10-CM

## 2024-11-08 LAB — PSA SERPL DL<=0.01 NG/ML-MCNC: 0.9 NG/ML (ref 0–4.5)

## 2024-12-19 DIAGNOSIS — E78.5 HYPERLIPIDEMIA LDL GOAL <70: ICD-10-CM

## 2024-12-19 RX ORDER — ROSUVASTATIN CALCIUM 20 MG/1
TABLET, COATED ORAL
Qty: 90 TABLET | Refills: 0 | Status: SHIPPED | OUTPATIENT
Start: 2024-12-19

## 2025-03-19 DIAGNOSIS — E78.5 HYPERLIPIDEMIA LDL GOAL <70: ICD-10-CM

## 2025-03-19 RX ORDER — ROSUVASTATIN CALCIUM 20 MG/1
TABLET, COATED ORAL
Qty: 90 TABLET | Refills: 0 | Status: SHIPPED | OUTPATIENT
Start: 2025-03-19

## 2025-06-16 SDOH — HEALTH STABILITY: PHYSICAL HEALTH: ON AVERAGE, HOW MANY DAYS PER WEEK DO YOU ENGAGE IN MODERATE TO STRENUOUS EXERCISE (LIKE A BRISK WALK)?: 5 DAYS

## 2025-06-16 SDOH — HEALTH STABILITY: PHYSICAL HEALTH: ON AVERAGE, HOW MANY MINUTES DO YOU ENGAGE IN EXERCISE AT THIS LEVEL?: 30 MIN

## 2025-06-16 ASSESSMENT — SOCIAL DETERMINANTS OF HEALTH (SDOH): HOW OFTEN DO YOU GET TOGETHER WITH FRIENDS OR RELATIVES?: THREE TIMES A WEEK

## 2025-06-17 ENCOUNTER — TRANSFERRED RECORDS (OUTPATIENT)
Dept: HEALTH INFORMATION MANAGEMENT | Facility: CLINIC | Age: 65
End: 2025-06-17

## 2025-06-17 ENCOUNTER — OFFICE VISIT (OUTPATIENT)
Dept: FAMILY MEDICINE | Facility: CLINIC | Age: 65
End: 2025-06-17
Payer: COMMERCIAL

## 2025-06-17 VITALS
OXYGEN SATURATION: 98 % | WEIGHT: 263 LBS | RESPIRATION RATE: 16 BRPM | HEIGHT: 70 IN | TEMPERATURE: 97.5 F | SYSTOLIC BLOOD PRESSURE: 144 MMHG | HEART RATE: 67 BPM | BODY MASS INDEX: 37.65 KG/M2 | DIASTOLIC BLOOD PRESSURE: 86 MMHG

## 2025-06-17 DIAGNOSIS — Z00.00 ENCOUNTER FOR MEDICARE ANNUAL WELLNESS EXAM: Primary | ICD-10-CM

## 2025-06-17 DIAGNOSIS — D44.6 CAROTID BODY TUMOR (H): ICD-10-CM

## 2025-06-17 DIAGNOSIS — E66.01 MORBID OBESITY (H): ICD-10-CM

## 2025-06-17 DIAGNOSIS — I25.10 CORONARY ARTERY DISEASE INVOLVING NATIVE HEART, UNSPECIFIED VESSEL OR LESION TYPE, UNSPECIFIED WHETHER ANGINA PRESENT: ICD-10-CM

## 2025-06-17 DIAGNOSIS — E78.5 HYPERLIPIDEMIA LDL GOAL <70: ICD-10-CM

## 2025-06-17 DIAGNOSIS — I10 HYPERTENSION GOAL BP (BLOOD PRESSURE) < 140/90: ICD-10-CM

## 2025-06-17 PROCEDURE — 3079F DIAST BP 80-89 MM HG: CPT | Performed by: FAMILY MEDICINE

## 2025-06-17 PROCEDURE — 99214 OFFICE O/P EST MOD 30 MIN: CPT | Mod: 25 | Performed by: FAMILY MEDICINE

## 2025-06-17 PROCEDURE — G0402 INITIAL PREVENTIVE EXAM: HCPCS | Performed by: FAMILY MEDICINE

## 2025-06-17 PROCEDURE — G2211 COMPLEX E/M VISIT ADD ON: HCPCS | Performed by: FAMILY MEDICINE

## 2025-06-17 PROCEDURE — 3077F SYST BP >= 140 MM HG: CPT | Performed by: FAMILY MEDICINE

## 2025-06-17 RX ORDER — ROSUVASTATIN CALCIUM 20 MG/1
TABLET, COATED ORAL
Qty: 90 TABLET | Refills: 3 | Status: SHIPPED | OUTPATIENT
Start: 2025-06-17

## 2025-06-17 NOTE — PROGRESS NOTES
"Preventive Care Visit  [facility]  Travis Cortez MD, Family Medicine  Jun 17, 2025      Assessment & Plan     ASSESSMENT / PLAN:  (Z00.00) Encounter for Medicare annual wellness exam  (primary encounter diagnosis)  Comment: generally healthy and normal exam. No falls/memory ok and good support system  Plan: seeing dermVince Conrado. Repeat labs in 6montsh    (E66.01) Morbid obesity (H)  Comment: needs help  Plan: low carb diet/exercise - weight lifting and intermittent fasting. Recheck in 6 months      (D44.6) Carotid body tumor (H)  Comment: stable  Plan: continue observation. S/p resection    (I25.10) Coronary artery disease involving native heart, unspecified vessel or lesion type, unspecified whether angina present  Comment: not seen cardiology in over 10 years and interested in follow-up   Plan: if chest pain or shortness of breath to er. Continue exercise and statin. Consider adding norvasc    (E78.5) Hyperlipidemia LDL goal <70  Comment: stable in past  Plan: rosuvastatin (CRESTOR) 20 MG tablet        Recheck in 6 months      (I10) Hypertension goal BP (blood pressure) < 140/90  Comment: a little high but outside blood pressure ok  Plan: continue med. Add norvasc if worse. Exercise. Call/email with questions/concerns        Patient has been advised of split billing requirements and indicates understanding: Yes        BMI  Estimated body mass index is 37.74 kg/m  as calculated from the following:    Height as of this encounter: 1.778 m (5' 10\").    Weight as of this encounter: 119.3 kg (263 lb).   Weight management plan: Discussed healthy diet and exercise guidelines    Counseling  Appropriate preventive services were addressed with this patient via screening, questionnaire, or discussion as appropriate for fall prevention, nutrition, physical activity, Tobacco-use cessation, social engagement, weight loss and cognition.  Checklist reviewing preventive services available has been given " to the patient.  Reviewed patient's diet, addressing concerns and/or questions.       Eric Henry is a 65 year old, presenting for the following:  Wellness Visit  No falls.  Memory ok. Eye exam 2 months.  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  . 2 kids. 2 grandkids 5,2- in [place]. Helpful.   Cardiology over 10 years- interested in seeing cardiology. No chest pain or shortness of breath. Walking. Active in yard. Winter in [place]. No nausea, vomiting or diarrhea or black/blooody stools. Colonoscopy 2022. No urine changes or hematira. Eggs in AM. Lower processed. No mole changes  or rashes - seeing dermatology.    Emotoinally doing ok. Sleep overall ok. Some shoulder issues. [place] shot in past. No selma noted.   Outside blood pressure reading 135/84.   History obesity, htn, cad, high cholesterol and carotid body tumor s/p removal 2011.      6/17/2025     8:11 AM   Additional Questions   Roomed by Haley   Accompanied by self         6/17/2025     8:11 AM   Patient Reported Additional Medications   Patient reports taking the following new medications n/a          HPI       Advance Care Planning    Discussed advance care planning with patient; however, patient declined at this time.        6/16/2025   General Health   How would you rate your overall physical health? Good   Feel stress (tense, anxious, or unable to sleep) Not at all         6/16/2025   Nutrition   Diet: Regular (no restrictions)    Breakfast skipped       Multiple values from one day are sorted in reverse-chronological order         6/16/2025   Exercise   Days per week of moderate/strenous exercise 5 days   Average minutes spent exercising at this level 30 min         6/16/2025   Social Factors   Frequency of gathering with friends or relatives Three times a week   Worry food won't last until get money to buy more No   Food not last or not have enough money for food? No   Do you have housing? (Housing is defined as stable permanent housing and  does not include staying outside in a car, in a tent, in an abandoned building, in an overnight shelter, or couch-surfing.) Yes   Are you worried about losing your housing? No   Lack of transportation? No   Unable to get utilities (heat,electricity)? No         6/17/2025   Fall Risk   Gait Speed Test (Document in seconds) 4.8   Gait Speed Test Interpretation Less than or equal to 5.00 seconds - PASS          6/16/2025   Activities of Daily Living- Home Safety   Needs help with the following daily activites None of the above   Safety concerns in the home None of the above         6/16/2025   Dental   Dentist two times every year? Yes         6/16/2025   Hearing Screening   Hearing concerns? None of the above         6/16/2025   Driving Risk Screening   Patient/family members have concerns about driving No         6/16/2025   General Alertness/Fatigue Screening   Have you been more tired than usual lately? No         6/16/2025   Urinary Incontinence Screening   Bothered by leaking urine in past 6 months No         Today's PHQ-2 Score:       6/16/2025     1:00 PM   PHQ-2 ( 1999 Pfizer)   Q1: Little interest or pleasure in doing things 0   Q2: Feeling down, depressed or hopeless 0   PHQ-2 Score 0    Q1: Little interest or pleasure in doing things Not at all   Q2: Feeling down, depressed or hopeless Not at all   PHQ-2 Score 0       Patient-reported           6/16/2025   Substance Use   Alcohol more than 3/day or more than 7/wk No   Do you have a current opioid prescription? No   How severe/bad is pain from 1 to 10? 2/10   Do you use any other substances recreationally? No     Social History     Tobacco Use    Smoking status: Never     Passive exposure: Never    Smokeless tobacco: Never   Vaping Use    Vaping status: Never Used   Substance Use Topics    Alcohol use: Yes     Alcohol/week: 10.0 standard drinks of alcohol    Drug use: No           6/16/2025   AAA Screening   Family history of Abdominal Aortic Aneurysm (AAA)?  "No         6/16/2025   One time HIV Screening   Previous HIV test? No   Last PSA:   Prostate Specific Antigen Screen   Date Value Ref Range Status   11/04/2024 0.90 0.00 - 4.50 ng/mL Final   11/19/2021 0.65 0.00 - 4.50 ug/L Final     ASCVD Risk   The 10-year ASCVD risk score (Gibran LUCAS, et al., 2019) is: 15.9%    Values used to calculate the score:      Age: 65 years      Sex: Male      Is Non- : No      Diabetic: No      Tobacco smoker: No      Systolic Blood Pressure: 154 mmHg      Is BP treated: Yes      HDL Cholesterol: 56 mg/dL      Total Cholesterol: 156 mg/dL            Reviewed and updated as needed this visit by Provider                      Current providers sharing in care for this patient include:  Patient Care Team:  Travis Cortez MD as PCP - General (Family Medicine)  Aaron Heart MD as MD (Hematology & Oncology)  Danya Palomo MD as Assigned PCP    The following health maintenance items are reviewed in Epic and correct as of today:  Health Maintenance   Topic Date Due    HIV SCREENING  Never done    PNEUMOCOCCAL VACCINE 50+ YEARS (1 of 2 - PCV) Never done    RSV VACCINE (1 - Risk 60-74 years 1-dose series) Never done    COVID-19 VACCINE (3 - 2024-25 season) 09/01/2024    ANNUAL REVIEW OF HM ORDERS  11/04/2025    MEDICARE ANNUAL WELLNESS VISIT  11/04/2025    BMP  11/04/2025    LIPID  11/04/2025    FALL RISK ASSESSMENT  06/17/2026    COLORECTAL CANCER SCREENING  01/14/2027    DIABETES SCREENING  11/04/2027    ADVANCE CARE PLANNING  11/04/2029    DTAP/TDAP/TD VACCINE (3 - Td or Tdap) 11/17/2030    HEPATITIS C SCREENING  Completed    PHQ-2 (once per calendar year)  Completed    INFLUENZA VACCINE  Completed    ZOSTER VACCINE  Completed    HPV VACCINE  Aged Out    MENINGITIS VACCINE  Aged Out            Objective    Exam  BP (!) 144/86   Pulse 67   Temp 97.5  F (36.4  C) (Tympanic)   Resp 16   Ht 1.778 m (5' 10\")   Wt 119.3 kg (263 lb)   SpO2 98%   BMI " 37.74 kg/m        Physical Exam  GENERAL: alert and no distress  EYES: Eyes grossly normal to inspection, PERRL and conjunctivae and sclerae normal  HENT: ear canals and TM's normal, nose and mouth without ulcers or lesions  NECK: no adenopathy, no asymmetry, masses, or scars  RESP: lungs clear to auscultation - no rales, rhonchi or wheezes  BREAST: normal without masses, tenderness or nipple discharge and no palpable axillary masses or adenopathy  CV: regular rate and rhythm, normal S1 S2, no S3 or S4, no murmur, click or rub, no peripheral edema   ABDOMEN: soft, nontender, no hepatosplenomegaly, no masses and bowel sounds normal  MS: no gross musculoskeletal defects noted, no edema  SKIN: no suspicious lesions or rashes  NEURO: Normal strength and tone, mentation intact and speech normal  PSYCH: mentation appears normal, affect normal/bright  Gait and balance assessed per Gait Speed Test.  Result as above.         6/17/2025   Mini Cog   Clock Draw Score 2 Normal   3 Item Recall 3 objects recalled   Mini Cog Total Score 5         Vision Screen  Reason Vision Screen Not Completed: Screening Recommend: Patient/Guardian Declined  Vision Screen Results: Pass  No Corrective Lenses, PLUS LENS REQUIRED: Pass      Signed Electronically by: Travis Cortez MD

## 2025-06-17 NOTE — PATIENT INSTRUCTIONS
Patient Education   Preventive Care Advice   This is general advice given by our system to help you stay healthy. However, your care team may have specific advice just for you. Please talk to your care team about your preventive care needs.  Nutrition  Eat 5 or more servings of fruits and vegetables each day.  Try wheat bread, brown rice and whole grain pasta (instead of white bread, rice, and pasta).  Get enough calcium and vitamin D. Check the label on foods and aim for 100% of the RDA (recommended daily allowance).  Lifestyle  Exercise at least 150 minutes each week  (30 minutes a day, 5 days a week).  Do muscle strengthening activities 2 days a week. These help control your weight and prevent disease.  No smoking.  Wear sunscreen to prevent skin cancer.  Have a dental exam and cleaning every 6 months.  Yearly exams  See your health care team every year to talk about:  Any changes in your health.  Any medicines your care team has prescribed.  Preventive care, family planning, and ways to prevent chronic diseases.  Shots (vaccines)   HPV shots (up to age 26), if you've never had them before.  Hepatitis B shots (up to age 59), if you've never had them before.  COVID-19 shot: Get this shot when it's due.  Flu shot: Get a flu shot every year.  Tetanus shot: Get a tetanus shot every 10 years.  Pneumococcal, hepatitis A, and RSV shots: Ask your care team if you need these based on your risk.  Shingles shot (for age 50 and up)  General health tests  Diabetes screening:  Starting at age 35, Get screened for diabetes at least every 3 years.  If you are younger than age 35, ask your care team if you should be screened for diabetes.  Cholesterol test: At age 39, start having a cholesterol test every 5 years, or more often if advised.  Bone density scan (DEXA): At age 50, ask your care team if you should have this scan for osteoporosis (brittle bones).  Hepatitis C: Get tested at least once in your life.  STIs (sexually  transmitted infections)  Before age 24: Ask your care team if you should be screened for STIs.  After age 24: Get screened for STIs if you're at risk. You are at risk for STIs (including HIV) if:  You are sexually active with more than one person.  You don't use condoms every time.  You or a partner was diagnosed with a sexually transmitted infection.  If you are at risk for HIV, ask about PrEP medicine to prevent HIV.  Get tested for HIV at least once in your life, whether you are at risk for HIV or not.  Cancer screening tests  Cervical cancer screening: If you have a cervix, begin getting regular cervical cancer screening tests starting at age 21.  Breast cancer scan (mammogram): If you've ever had breasts, begin having regular mammograms starting at age 40. This is a scan to check for breast cancer.  Colon cancer screening: It is important to start screening for colon cancer at age 45.  Have a colonoscopy test every 10 years (or more often if you're at risk) Or, ask your provider about stool tests like a FIT test every year or Cologuard test every 3 years.  To learn more about your testing options, visit:   .  For help making a decision, visit:   https://bit.ly/yx68387.  Prostate cancer screening test: If you have a prostate, ask your care team if a prostate cancer screening test (PSA) at age 55 is right for you.  Lung cancer screening: If you are a current or former smoker ages 50 to 80, ask your care team if ongoing lung cancer screenings are right for you.  For informational purposes only. Not to replace the advice of your health care provider. Copyright   2023 [facility]. All rights reserved. Clinically reviewed by the [facility]. EVIIVO [id number] - REV 01/24.  Preventing Falls: Care Instructions  Injuries and health problems such as trouble walking or poor eyesight can increase your risk of falling. So can some medicines. But there are things you can do to help  "prevent falls. You can exercise to get stronger. You can also arrange your home to make it safer.    Talk to your doctor about the medicines you take. Ask if any of them increase the risk of falls and whether they can be changed or stopped.   Try to exercise regularly. It can help improve your strength and balance. This can help lower your risk of falling.         Practice fall safety and prevention.   Wear low-heeled shoes that fit well and give your feet good support. Talk to your doctor if you have foot problems that make this hard.  Carry a cellphone or wear a medical alert device that you can use to call for help.  Use stepladders instead of chairs to reach high objects. Don't climb if you're at risk for falls. Ask for help, if needed.  Wear the correct eyeglasses, if you need them.        Make your home safer.   Remove rugs, cords, clutter, and furniture from walkways.  Keep your house well lit. Use night-lights in hallways and bathrooms.  Install and use sturdy handrails on stairways.  Wear nonskid footwear, even inside. Don't walk barefoot or in socks without shoes.        Be safe outside.   Use handrails, curb cuts, and ramps whenever possible.  Keep your hands free by using a shoulder bag or backpack.  Try to walk in well-lit areas. Watch out for uneven ground, changes in pavement, and debris.  Be careful in the winter. Walk on the grass or gravel when sidewalks are slippery. Use de-icer on steps and walkways. Add non-slip devices to shoes.    Put grab bars and nonskid mats in your shower or tub and near the toilet. Try to use a shower chair or bath bench when bathing.   Get into a tub or shower by putting in your weaker leg first. Get out with your strong side first. Have a phone or medical alert device in the bathroom with you.   Where can you learn more?  Go to https://www.[employer].net/patiented  Enter G117 in the search box to learn more about \"Preventing Falls: Care Instructions.\"  Current as of: " July 31, 2024  Content Version: 14.5    2408-2314 [employer].   Care instructions adapted under license by your healthcare professional. If you have questions about a medical condition or this instruction, always ask your healthcare professional. [employer] disclaims any warranty or liability for your use of this information.

## 2025-06-27 ENCOUNTER — TRANSFERRED RECORDS (OUTPATIENT)
Dept: HEALTH INFORMATION MANAGEMENT | Facility: CLINIC | Age: 65
End: 2025-06-27
Payer: COMMERCIAL